# Patient Record
Sex: FEMALE | Race: BLACK OR AFRICAN AMERICAN | URBAN - METROPOLITAN AREA
[De-identification: names, ages, dates, MRNs, and addresses within clinical notes are randomized per-mention and may not be internally consistent; named-entity substitution may affect disease eponyms.]

---

## 2019-10-01 LAB — HBA1C MFR BLD HPLC: 9.8 %

## 2019-11-04 ENCOUNTER — OFFICE VISIT (OUTPATIENT)
Dept: ENDOCRINOLOGY | Age: 34
End: 2019-11-04

## 2019-11-04 VITALS
TEMPERATURE: 99.4 F | HEIGHT: 65 IN | OXYGEN SATURATION: 97 % | SYSTOLIC BLOOD PRESSURE: 137 MMHG | WEIGHT: 237.5 LBS | DIASTOLIC BLOOD PRESSURE: 80 MMHG | BODY MASS INDEX: 39.57 KG/M2 | HEART RATE: 94 BPM | RESPIRATION RATE: 18 BRPM

## 2019-11-04 DIAGNOSIS — E78.2 MIXED HYPERLIPIDEMIA: ICD-10-CM

## 2019-11-04 DIAGNOSIS — I10 ESSENTIAL HYPERTENSION: ICD-10-CM

## 2019-11-04 DIAGNOSIS — E11.65 UNCONTROLLED TYPE 2 DIABETES MELLITUS WITH HYPERGLYCEMIA (HCC): Primary | ICD-10-CM

## 2019-11-04 RX ORDER — METFORMIN HYDROCHLORIDE 1000 MG/1
1000 TABLET ORAL 2 TIMES DAILY WITH MEALS
COMMUNITY
End: 2019-11-04 | Stop reason: SDUPTHER

## 2019-11-04 RX ORDER — ETONOGESTREL 68 MG/1
IMPLANT SUBCUTANEOUS
COMMUNITY
End: 2022-06-24 | Stop reason: ALTCHOICE

## 2019-11-04 RX ORDER — NATEGLINIDE 120 MG/1
120 TABLET ORAL
Qty: 90 TAB | Refills: 6 | Status: SHIPPED | OUTPATIENT
Start: 2019-11-04 | End: 2020-04-08 | Stop reason: SDUPTHER

## 2019-11-04 RX ORDER — PEN NEEDLE, DIABETIC 31 GX3/16"
NEEDLE, DISPOSABLE MISCELLANEOUS
Qty: 100 PEN NEEDLE | Refills: 6 | Status: SHIPPED | OUTPATIENT
Start: 2019-11-04 | End: 2021-08-13 | Stop reason: SDUPTHER

## 2019-11-04 RX ORDER — METFORMIN HYDROCHLORIDE 1000 MG/1
1000 TABLET ORAL 2 TIMES DAILY WITH MEALS
Qty: 60 TAB | Refills: 6 | Status: SHIPPED | OUTPATIENT
Start: 2019-11-04 | End: 2020-04-08 | Stop reason: SDUPTHER

## 2019-11-04 RX ORDER — INSULIN LISPRO 100 [IU]/ML
2 INJECTION, SOLUTION INTRAVENOUS; SUBCUTANEOUS
COMMUNITY
End: 2019-11-04 | Stop reason: ALTCHOICE

## 2019-11-04 NOTE — PROGRESS NOTES
Room 3    Identified pt with two pt identifiers(name and ). Reviewed record in preparation for visit and have obtained necessary documentation. All patient medications has been reviewed. Chief Complaint   Patient presents with   [address] [place] [address] Diabetes       Health Maintenance Due   Topic    DTaP/Tdap/Td series (1 - Tdap)    PAP AKA CERVICAL CYTOLOGY     Influenza Age 5 to Adult        Vitals:    19 1320   BP: 137/80   Pulse: 94   Resp: 18   Temp: 99.4 °F (37.4 °C)   TempSrc: Oral   SpO2: 97%   Weight: 237 lb 8 oz (107.7 kg)   Height: 5' 5.43\" (1.662 m)   PainSc:   0 - No pain   LMP: 2019       Wt Readings from Last 3 Encounters:   19 237 lb 8 oz (107.7 kg)     Temp Readings from Last 3 Encounters:   19 99.4 °F (37.4 °C) (Oral)     BP Readings from Last 3 Encounters:   19 137/80     Pulse Readings from Last 3 Encounters:   19 94       No results found for: HBA1C, HGBE8, NMP6LKDU, PSS4DNCE, QGO3FYYR    Coordination of Care Questionnaire:   1) Have you been to an emergency room, urgent care, or hospitalized since your last visit?   no       2. Have seen or consulted any other health care provider since your last visit? NO    3) Do you have an Advanced Directive/ Living Will in place? NO  If yes, do we have a copy on file NO  If no, would you like information NO    Patient is accompanied by self I have received verbal consent from Qi Cadet to discuss any/all medical information while they are present in the room.

## 2019-11-04 NOTE — PATIENT INSTRUCTIONS
Do not skip meals Do not eat in between meals Reduce carbs- pasta, rice, potatoes, bread Do not drink juices or sodas Donot eat peanut butter Do not eat sugar free cookies and cakes Do not eat peaches, grapes, oranges, and pineapples,  And fruit medleys  
 
------------------------------------------------------------------------------------------------------------------------ Stay on toujeo at 60 units a day Stop humalog Start on starlix  120 mg  3 times a day right before meals Stay  On  jardiance  And  Metformin Start on victoza at 0.6 mg a day and after a week , increase to 1.2 mg a day

## 2019-11-04 NOTE — PROGRESS NOTES
HISTORY OF PRESENT ILLNESS  Reema Irene is a 29 y.o. female. HPI  Patient here for initial visit of Type 2 diabetes mellitus . Referred : by self/pcp    H/o diabetes for 15  years     Current A1C is 9.8 %  From oct 2019  and symptoms/problems include polyuria and polydipsia     Current diabetic medications include jardiance , lantus and humalog  And  Metformin 1 gm bid twice a day . Current monitoring regimen: home blood tests - weekly  Home blood sugar records: trend: fluctuating a lot  Any episodes of hypoglycemia? no    Weight trend: fluctuating a lot  Prior visit with dietician: no  Current diet: \"unhealthy\" diet in general  Current exercise: no regular exercise    Known diabetic complications: none  Cardiovascular risk factors: family history, diabetes mellitus    Eye exam current (within one year): no  FRANCISCO: no     Past Medical History:   Diagnosis Date    [facility]     Proliferative diabetic retinopathy [facility] [facility]     Retinal detachment 2017    left eye     Past Surgical History:   Procedure Laterality Date    HX ANKLE FRACTURE TX      HX  SECTION      HX RETINAL DETACHMENT REPAIR  2017    HX TONSILLECTOMY       Current Outpatient Medications   Medication Sig    metFORMIN (GLUCOPHAGE) 1,000 mg tablet Take 1,000 mg by mouth two (2) times daily (with meals).  insulin lispro (HUMALOG U-100 INSULIN) 100 unit/mL injection 2 Units by SubCUTAneous route daily (with dinner).  insulin glargine,hum.rec.anlog (TOUJEO SOLOSTAR U-300 INSULIN SC) 58 Units by SubCUTAneous route daily.  GLUCAGON IJ by Injection route.  etonogestrel (NEXPLANON) 68 mg impl by SubDERmal route.  empagliflozin (JARDIANCE) 25 mg tablet Take  by mouth daily.  pen needle, diabetic (BD ULTRA-FINE KELLY PEN NEEDLE) by Does Not Apply route. No current facility-administered medications for this visit. Review of Systems   Constitutional: Negative.     HENT: Negative. Eyes: Negative. Respiratory: Negative. Cardiovascular: Negative. Gastrointestinal: Negative. Genitourinary: Negative. Musculoskeletal: Negative. Skin: Negative. Neurological: Negative. Endo/Heme/Allergies: Negative. Psychiatric/Behavioral: Negative. Physical Exam   Constitutional: She is oriented to person, place, and time. She appears well-developed and well-nourished. HENT:   Head: Normocephalic. Eyes: Pupils are equal, round, and reactive to light. Conjunctivae and EOM are normal.   Neck: Normal range of motion. Neck supple. Cardiovascular: Normal rate and regular rhythm. Pulmonary/Chest: Effort normal and breath sounds normal.   Abdominal: Soft. Bowel sounds are normal.   Musculoskeletal: Normal range of motion. Neurological: She is alert and oriented to person, place, and time. She has normal reflexes. Skin: Skin is warm and dry. Psychiatric: She has a normal mood and affect. Diabetic foot exam:     Left Foot:   Visual Exam: normal    Pulse DP: 2+ (normal)   Filament test: normal sensation    Vibratory sensation: normal      Right Foot:   Visual Exam: normal    Pulse DP: 2+ (normal)   Filament test: normal sensation    Vibratory sensation: normal      ASSESSMENT and PLAN    1. Type 2 DM, poorly controlled : a1c is 9.8 %   From  Oct 2019     Discussed DM 2 patho-physiology extensively     Stay on toujeo at 60 units a day   Stop humalog   Start on starlix  120 mg  3 times a day right before meals    Stay  On  jardiance  And  Metformin   Start on victoza at 0.6 mg a day and after a week , increase to 1.2 mg a day     Patient is advised about checking blood sugars 4 times a day and maintaining log book. The danger of having low blood sugars has been explained with inappropriate use of insulin  Patient voiced understanding and using the printed instructions at home. Hypoglycemia management has been explained to the patient.      Carbohydrate counting discussed with the patient    lab results and schedule of future lab studies reviewed with patient  specific diabetic recommendations: diabetic diet discussed in detail, written exchange diet given, home glucose monitoring emphasized, home glucose monitoring demonstrated and taught, glucose meter dispensed to patient, all medications, side effects and compliance discussed carefully, use and side effects of insulin is taught, foot care discussed and Podiatry visits discussed, glycohemoglobin and other lab monitoring discussed and long term diabetic complications discussed    2. Hypoglycemia :  Educated on treating the hypoglycemia. Discussed INSULIn use and prescribed    3. HTN : continue current care. Patient is educated about importance of compliance with anti-hypertensives especially ARB/ACEI    4. Dyslipidemia : continue current meds. Patient is educated about benefits and adverse effects of statins and explained how benefits outweigh risk.     5. use of aspirin to prevent MI and TIA's discussed      > 50 % of time is spent on counseling   Patient voiced understanding her plan of care

## 2019-11-04 NOTE — LETTER
11/10/19 Patient: Esther Taveras YOB: 1985 Date of Visit: 11/4/2019 Nia Miller NP 
[address] [facility] [address] VIA Facsimile: [phone] Dear Nia Miller NP, Thank you for referring Ms. Chente Boothe to [address] [place] [address] for evaluation. My notes for this consultation are attached. If you have questions, please do not hesitate to call me. I look forward to following your patient along with you. Sincerely, Elfego Hoffman MD

## 2020-01-30 ENCOUNTER — OFFICE VISIT (OUTPATIENT)
Dept: ENDOCRINOLOGY | Age: 35
End: 2020-01-30

## 2020-01-30 VITALS
TEMPERATURE: 98.1 F | HEART RATE: 60 BPM | HEIGHT: 66 IN | DIASTOLIC BLOOD PRESSURE: 87 MMHG | BODY MASS INDEX: 36.79 KG/M2 | RESPIRATION RATE: 18 BRPM | OXYGEN SATURATION: 95 % | WEIGHT: 228.9 LBS | SYSTOLIC BLOOD PRESSURE: 145 MMHG

## 2020-01-30 DIAGNOSIS — E11.65 UNCONTROLLED TYPE 2 DIABETES MELLITUS WITH HYPERGLYCEMIA (HCC): Primary | ICD-10-CM

## 2020-01-30 DIAGNOSIS — I10 ESSENTIAL HYPERTENSION: ICD-10-CM

## 2020-01-30 DIAGNOSIS — B35.3 TINEA PEDIS, UNSPECIFIED LATERALITY: ICD-10-CM

## 2020-01-30 DIAGNOSIS — E78.2 MIXED HYPERLIPIDEMIA: ICD-10-CM

## 2020-01-30 NOTE — LETTER
2/2/20 Patient: Bridgett Peraza YOB: 1985 Date of Visit: 1/30/2020 Frankey Console, NP 
[address] [facility] [address] VIA Facsimile: [phone] Dear Frankey Console, NP, Thank you for referring Ms. Madhu Batres to [address] [place] [address] for evaluation. My notes for this consultation are attached. If you have questions, please do not hesitate to call me. I look forward to following your patient along with you. Sincerely, Ailin Ace MD

## 2020-01-30 NOTE — PATIENT INSTRUCTIONS
Do not skip meals  Do not eat in between meals    Reduce carbs- pasta, rice, potatoes, bread   Do not drink juices or sodas  Donot eat peanut butter     Do not eat sugar free cookies and cakes   Do not eat peaches, grapes, oranges, and pineapples,  And fruit medleys     ------------------------------------------------------------------------------------------------------------------------    Stay on toujeo at 60 units a day        starlix  120 mg  3 times a day right before meals      Stay  On  jardiance  And  Metformin     Stay on  victoza at 1.2 mg a day

## 2020-01-30 NOTE — PROGRESS NOTES
1. Have you been to the ER, urgent care clinic since your last visit? No  Hospitalized since your last visit? No    2. Have you seen or consulted any other health care providers outside of the [address] [place] [address] since your last visit? Include any pap smears or colon screening. No    Wt Readings from Last 3 Encounters:   01/30/20 228 lb 14.4 oz (103.8 kg)   11/04/19 237 lb 8 oz (107.7 kg)     Temp Readings from Last 3 Encounters:   01/30/20 98.1 °F (36.7 °C) (Oral)   11/04/19 99.4 °F (37.4 °C) (Oral)     BP Readings from Last 3 Encounters:   01/30/20 145/87   11/04/19 137/80     Pulse Readings from Last 3 Encounters:   01/30/20 60   11/04/19 94     Lab Results   Component Value Date/Time    Hemoglobin A1c, External 9.8 10/01/2019     Patient does not have meter today.

## 2020-01-30 NOTE — PROGRESS NOTES
HISTORY OF PRESENT ILLNESS  Dewayne Duke is a 28 y.o. female. HPI  Patient here for first follow up after  initial visit of Type 2 diabetes mellitus   From 2019     Lost 9 lbs   No meter   Labs are not available           Old history      Referred : by self/pcp    H/o diabetes for 15  years     Current A1C is 9.8 %  From oct 2019  and symptoms/problems include polyuria and polydipsia     Current diabetic medications include jardiance , lantus and humalog  And  Metformin 1 gm bid twice a day . Current monitoring regimen: home blood tests - weekly  Home blood sugar records: trend: fluctuating a lot  Any episodes of hypoglycemia? no    Weight trend: fluctuating a lot  Prior visit with dietician: no  Current diet: \"unhealthy\" diet in general  Current exercise: no regular exercise    Known diabetic complications: none  Cardiovascular risk factors: family history, diabetes mellitus    Eye exam current (within one year): no  FRANCISCO: no     Past Medical History:   Diagnosis Date    DM (diabetes mellitus) ([address]     Proliferative [facility] retin[facility] ([facility] [address]     Retinal detachment 2017    left eye     Past Surgical History:   Procedure Laterality Date    HX ANKLE FRACTURE TX      HX  SECTION      HX RETINAL DETACHMENT REPAIR  2017    HX TONSILLECTOMY       Current Outpatient Medications   Medication Sig    GLUCAGON IJ by Injection route.  etonogestrel (NEXPLANON) 68 mg impl by SubDERmal route.  pen needle, diabetic (BD ULTRA-FINE KELLY PEN NEEDLE) by Does Not Apply route.  empagliflozin (JARDIANCE) 25 mg tablet Take 1 Tab by mouth daily.  metFORMIN (GLUCOPHAGE) 1,000 mg tablet Take 1 Tab by mouth two (2) times daily (with meals).  insulin glargine U-300 conc (TOUJEO SOLOSTAR U-300 INSULIN) 300 unit/mL (1.5 mL) inpn pen Inject 60 units at bedtime.  nateglinide (STARLIX) 120 mg tablet Take 1 Tab by mouth Before breakfast, lunch, and dinner.  Stop Humalog    liraglutide (VICTOZA) 0.6 mg/0.1 mL (18 mg/3 mL) pnij Inject 0.6mg daily for one week, then inject 1.2mg daily thereafter.  Insulin Needles, Disposable, (KELLY PEN NEEDLE) 32 gauge x 5/32\" ndle Use to inject insulin once daily. Dx. Code E11.65    insulin glargine,hum.rec.anlog (TOUJEO SOLOSTAR U-300 INSULIN SC) 58 Units by SubCUTAneous route daily. No current facility-administered medications for this visit. Review of Systems   Constitutional: Negative. HENT: Negative. Eyes: Negative. Respiratory: Negative. Cardiovascular: Negative. Gastrointestinal: Negative. Genitourinary: Negative. Musculoskeletal: Negative. Skin: Negative. Neurological: Negative. Endo/Heme/Allergies: Negative. Psychiatric/Behavioral: Negative. Physical Exam   Constitutional: She is oriented to person, place, and time. She appears well-developed and well-nourished. HENT:   Head: Normocephalic. Eyes: Pupils are equal, round, and reactive to light. Conjunctivae and EOM are normal.   Neck: Normal range of motion. Neck supple. Cardiovascular: Normal rate and regular rhythm. Pulmonary/Chest: Effort normal and breath sounds normal.   Abdominal: Soft. Bowel sounds are normal.   Musculoskeletal: Normal range of motion. Neurological: She is alert and oriented to person, place, and time. She has normal reflexes. Skin: Skin is warm and dry. Psychiatric: She has a normal mood and affect. Diabetic foot exam:     Left Foot:   Visual Exam: normal    Pulse DP: 2+ (normal)   Filament test: normal sensation    Vibratory sensation: normal      Right Foot:   Visual Exam: normal    Pulse DP: 2+ (normal)   Filament test: normal sensation    Vibratory sensation: normal      ASSESSMENT and PLAN    1.   Type 2 DM, poorly controlled : a1c is   ???   Compared to    9.8 %   From  Oct 2019     Unable to comment on progress yet , but she lost weight   No log   No labs , ordered today     Stay on toujeo at 60 units a day   Stop humalog   Stay on  starlix  120 mg  3 times a day right before meals    Stay  On  jardiance  And  Metformin   Stay  on victoza at 1.2 mg a day     Patient is advised about checking blood sugars 4 times a day and maintaining log book. The danger of having low blood sugars has been explained with inappropriate use of insulin  Patient voiced understanding and using the printed instructions at home. Hypoglycemia management has been explained to the patient. Carbohydrate counting discussed with the patient  lab results and schedule of future lab studies reviewed with patient    2. Hypoglycemia :  Educated on treating the hypoglycemia. Discussed INSULIn use and prescribed    3. HTN : uncontrolled,  not on meds . Patient is educated about importance of compliance with anti-hypertensives especially ARB/ACEI    4. Dyslipidemia : not on meds. Patient is educated about benefits and adverse effects of statins and explained how benefits outweigh risk.     5. use of aspirin to prevent MI and TIA's discussed    Tinea pedis -  Referred her to podiatrist       > 50 % of time is spent on counseling   Patient voiced understanding her plan of care

## 2020-02-03 LAB
ALBUMIN SERPL-MCNC: 4.1 G/DL (ref 3.8–4.8)
ALBUMIN/CREAT UR: 207 MG/G CREAT (ref 0–29)
ALBUMIN/GLOB SERPL: 1.3 {RATIO} (ref 1.2–2.2)
ALP SERPL-CCNC: 94 IU/L (ref 39–117)
ALT SERPL-CCNC: 15 IU/L (ref 0–32)
AST SERPL-CCNC: 9 IU/L (ref 0–40)
BILIRUB SERPL-MCNC: 0.6 MG/DL (ref 0–1.2)
BUN SERPL-MCNC: 11 MG/DL (ref 6–20)
BUN/CREAT SERPL: 18 (ref 9–23)
CALCIUM SERPL-MCNC: 9.2 MG/DL (ref 8.7–10.2)
CHLORIDE SERPL-SCNC: 101 MMOL/L (ref 96–106)
CHOLEST SERPL-MCNC: 173 MG/DL (ref 100–199)
CO2 SERPL-SCNC: 18 MMOL/L (ref 20–29)
CREAT SERPL-MCNC: 0.61 MG/DL (ref 0.57–1)
CREAT UR-MCNC: 57.7 MG/DL
EST. AVERAGE GLUCOSE BLD GHB EST-MCNC: 255 MG/DL
GLOBULIN SER CALC-MCNC: 3.1 G/DL (ref 1.5–4.5)
GLUCOSE SERPL-MCNC: 146 MG/DL (ref 65–99)
HBA1C MFR BLD: 10.5 % (ref 4.8–5.6)
HDLC SERPL-MCNC: 44 MG/DL
INTERPRETATION, 910389: NORMAL
LDLC SERPL CALC-MCNC: 88 MG/DL (ref 0–99)
Lab: NORMAL
MICROALBUMIN UR-MCNC: 119.6 UG/ML
POTASSIUM SERPL-SCNC: 4.3 MMOL/L (ref 3.5–5.2)
PROT SERPL-MCNC: 7.2 G/DL (ref 6–8.5)
SODIUM SERPL-SCNC: 137 MMOL/L (ref 134–144)
SPECIMEN STATUS REPORT, ROLRST: NORMAL
TRIGL SERPL-MCNC: 204 MG/DL (ref 0–149)
VLDLC SERPL CALC-MCNC: 41 MG/DL (ref 5–40)

## 2020-02-04 NOTE — PROGRESS NOTES
Looks like diabetes is worser than prior visits,   Kidneys are being hurt from diabetes     Take care of diabetes more, check more,  eat right etc.,

## 2020-02-05 ENCOUNTER — TELEPHONE (OUTPATIENT)
Dept: ENDOCRINOLOGY | Age: 35
End: 2020-02-05

## 2020-02-05 NOTE — TELEPHONE ENCOUNTER
Patient called to get A1c stating she does not have my chart, complained that she has called 3xs today and no call back.  States message with results can be left

## 2020-02-05 NOTE — TELEPHONE ENCOUNTER
----- Message from Gautam Leyva sent at 2/5/2020  1:51 PM EST -----  Regarding: Dr. Mynor Britt  Pt would like a call regarding her test results.  Contact is [phone] would like call back after 4:15pm

## 2020-02-27 DIAGNOSIS — E11.65 UNCONTROLLED TYPE 2 DIABETES MELLITUS WITH HYPERGLYCEMIA (HCC): Primary | ICD-10-CM

## 2020-02-27 NOTE — TELEPHONE ENCOUNTER
Patient is requesting the insulin for Hodgkins Frias, and Victoza rx to be sent to pcp office they have a pharmacy that she can use that is much cheaper. [facility].  She does not know the fax

## 2020-02-27 NOTE — TELEPHONE ENCOUNTER
PCP: Rodolfo Rice NP    Last appt: 1/30/2020  Future Appointments   Date Time Provider Agustina Suarez   4/8/2020  3:45 PM Soraya Bobo MD CDE [facility] SCHED       Requested Prescriptions     Pending Prescriptions Disp Refills    liraglutide (VICTOZA) 0.6 mg/0.1 mL (18 mg/3 mL) pnij 6 mL 6     Sig: Inject 0.6mg daily for one week, then inject 1.2mg daily thereafter.  insulin glargine U-300 conc (TOUJEO SOLOSTAR U-300 INSULIN) 300 unit/mL (1.5 mL) inpn pen 30 mL 6     Sig: Inject 60 units at bedtime.  empagliflozin (JARDIANCE) 25 mg tablet 30 Tab 6     Sig: Take 1 Tab by mouth daily.

## 2020-02-27 NOTE — TELEPHONE ENCOUNTER
Two pt identifiers confirmed. Received [facility] contact number to call to receive pharmacy's fax number. pt verbalized understanding of information discussed w/ no further questions at this time.

## 2020-02-27 NOTE — TELEPHONE ENCOUNTER
Two pt identifiers confirmed. Received fax number [phone] to [facility]. Pharmacy verbalized understanding of information discussed w/ no further questions at this time.

## 2020-04-08 ENCOUNTER — VIRTUAL VISIT (OUTPATIENT)
Dept: ENDOCRINOLOGY | Age: 35
End: 2020-04-08

## 2020-04-08 DIAGNOSIS — I10 ESSENTIAL HYPERTENSION: ICD-10-CM

## 2020-04-08 DIAGNOSIS — E11.65 UNCONTROLLED TYPE 2 DIABETES MELLITUS WITH HYPERGLYCEMIA (HCC): Primary | ICD-10-CM

## 2020-04-08 DIAGNOSIS — E78.2 MIXED HYPERLIPIDEMIA: ICD-10-CM

## 2020-04-08 DIAGNOSIS — R80.1 PERSISTENT PROTEINURIA: ICD-10-CM

## 2020-04-08 RX ORDER — IRBESARTAN 150 MG/1
150 TABLET ORAL DAILY
Qty: 90 TAB | Refills: 2 | Status: SHIPPED | OUTPATIENT
Start: 2020-04-08 | End: 2020-08-18 | Stop reason: SDUPTHER

## 2020-04-08 RX ORDER — NATEGLINIDE 120 MG/1
120 TABLET ORAL
Qty: 270 TAB | Refills: 2 | Status: SHIPPED | OUTPATIENT
Start: 2020-04-08 | End: 2020-08-18 | Stop reason: SDUPTHER

## 2020-04-08 RX ORDER — METFORMIN HYDROCHLORIDE 1000 MG/1
1000 TABLET ORAL 2 TIMES DAILY WITH MEALS
Qty: 180 TAB | Refills: 2 | Status: SHIPPED | OUTPATIENT
Start: 2020-04-08 | End: 2020-08-18 | Stop reason: SDUPTHER

## 2020-04-08 NOTE — PROGRESS NOTES
1. Have you been to the ER, urgent care clinic since your last visit? [facility]/March 2020/Fungal Infection On Finger  Hospitalized since your last visit? No    2. Have you seen or consulted any other health care providers outside of the [address] [place] [address] since your last visit? Include any pap smears or colon screening.  No

## 2020-04-08 NOTE — PROGRESS NOTES
HISTORY OF PRESENT ILLNESS  Rachael Dodson is a 28 y.o. female. HPI  Patient here for first follow up after  initial visit of Type 2 diabetes mellitus   From 2020  Lost 9 lbs   No meter   Labs are not available           Old history      Referred : by self/pcp    H/o diabetes for 15  years     Current A1C is 9.8 %  From oct 2019  and symptoms/problems include polyuria and polydipsia     Current diabetic medications include jardiance , lantus and humalog  And  Metformin 1 gm bid twice a day . Current monitoring regimen: home blood tests - weekly  Home blood sugar records: trend: fluctuating a lot  Any episodes of hypoglycemia? no    Weight trend: fluctuating a lot  Prior visit with dietician: no  Current diet: \"unhealthy\" diet in general  Current exercise: no regular exercise    Known diabetic complications: none  Cardiovascular risk factors: family history, diabetes mellitus    Eye exam current (within one year): no  FRANCISCO: no     Past Medical History:   Diagnosis Date    [facility] [address] [facility]     Proliferative diabetic retinopathy ([facility]     Retinal detachment 2017    left eye     Past Surgical History:   Procedure Laterality Date    HX ANKLE FRACTURE TX      HX  SECTION      HX RETINAL DETACHMENT REPAIR  2017    HX TONSILLECTOMY       Current Outpatient Medications   Medication Sig    liraglutide (VICTOZA) 0.6 mg/0.1 mL (18 mg/3 mL) pnij Inject 0.6mg daily for one week, then inject 1.2mg daily thereafter.  insulin glargine U-300 conc (TOUJEO SOLOSTAR U-300 INSULIN) 300 unit/mL (1.5 mL) inpn pen Inject 60 units at bedtime.  empagliflozin (JARDIANCE) 25 mg tablet Take 1 Tab by mouth daily.  GLUCAGON IJ by Injection route.  etonogestrel (NEXPLANON) 68 mg impl by SubDERmal route.  pen needle, diabetic (BD ULTRA-FINE KELLY PEN NEEDLE) by Does Not Apply route.     metFORMIN (GLUCOPHAGE) 1,000 mg tablet Take 1 Tab by mouth two (2) times daily (with meals).  nateglinide (STARLIX) 120 mg tablet Take 1 Tab by mouth Before breakfast, lunch, and dinner. Stop Humalog    Insulin Needles, Disposable, (KELLY PEN NEEDLE) 32 gauge x 5/32\" ndle Use to inject insulin once daily. Dx. Code E11.65     No current facility-administered medications for this visit. Review of Systems   Constitutional: Negative. HENT: Negative. Eyes: Negative. Respiratory: Negative. Cardiovascular: Negative. Gastrointestinal: Negative. Genitourinary: Negative. Musculoskeletal: Negative. Skin: Negative. Neurological: Negative. Endo/Heme/Allergies: Negative. Psychiatric/Behavioral: Negative. Physical Exam   Constitutional: She is oriented to person, place, and time. She appears well-developed and well-nourished. HENT:   Head: Normocephalic. Eyes: Pupils are equal, round, and reactive to light. Conjunctivae and EOM are normal.   Neck: Normal range of motion. Neck supple. Cardiovascular: Normal rate and regular rhythm. Pulmonary/Chest: Effort normal and breath sounds normal.   Abdominal: Soft. Bowel sounds are normal.   Musculoskeletal: Normal range of motion. Neurological: She is alert and oriented to person, place, and time. She has normal reflexes. Skin: Skin is warm and dry. Psychiatric: She has a normal mood and affect.      Diabetic foot exam:     Left Foot:   Visual Exam: normal    Pulse DP: 2+ (normal)   Filament test: normal sensation    Vibratory sensation: normal      Right Foot:   Visual Exam: normal    Pulse DP: 2+ (normal)   Filament test: normal sensation    Vibratory sensation: normal        Lab Results   Component Value Date/Time    Hemoglobin A1c 10.5 (H) 02/01/2020 07:42 AM    Hemoglobin A1c, External 9.8 10/01/2019    Glucose 146 (H) 02/01/2020 07:42 AM    Microalb/Creat ratio (ug/mg creat.) 207 (H) 02/01/2020 07:42 AM    LDL, calculated 88 02/01/2020 07:42 AM    Creatinine 0.61 02/01/2020 07:42 AM      Lab Results Component Value Date/Time    Cholesterol, total 173 02/01/2020 07:42 AM    HDL Cholesterol 44 02/01/2020 07:42 AM    LDL, calculated 88 02/01/2020 07:42 AM    Triglyceride 204 (H) 02/01/2020 07:42 AM     Lab Results   Component Value Date/Time    ALT (SGPT) 15 02/01/2020 07:42 AM    AST (SGOT) 9 02/01/2020 07:42 AM    Alk. phosphatase 94 02/01/2020 07:42 AM    Bilirubin, total 0.6 02/01/2020 07:42 AM    Albumin 4.1 02/01/2020 07:42 AM    Protein, total 7.2 02/01/2020 07:42 AM     Lab Results   Component Value Date/Time    GFR est non- 02/01/2020 07:42 AM    GFR est  02/01/2020 07:42 AM    Creatinine 0.61 02/01/2020 07:42 AM    BUN 11 02/01/2020 07:42 AM    Sodium 137 02/01/2020 07:42 AM    Potassium 4.3 02/01/2020 07:42 AM    Chloride 101 02/01/2020 07:42 AM    CO2 18 (L) 02/01/2020 07:42 AM         ASSESSMENT and PLAN    1. Type 2 DM, poorly controlled : a1c is  Due  Now , not done    10 %      From feb 2020    Compared  To     9.8 %   From  Oct 2019     Unable to comment on progress yet , but she lost weight   Verbal log : 187, 201 , 164 , 138 , 161       Stay on toujeo at 60 units a day   Stop humalog   Stay on  starlix  120 mg  3 times a day right before meals    Stay  On  jardiance  And  Metformin   Stay  on victoza at 1.2 mg a day     Patient is advised about checking blood sugars 4 times a day and maintaining log book. The danger of having low blood sugars has been explained with inappropriate use of insulin  Patient voiced understanding and using the printed instructions at home. Hypoglycemia management has been explained to the patient. Carbohydrate counting discussed with the patient  lab results and schedule of future lab studies reviewed with patient    2. Hypoglycemia :  Educated on treating the hypoglycemia. Discussed INSULIn use and prescribed    3. HTN : uncontrolled,  Starting on irbesartan as proteinuria is noticeable    .  Patient is educated about importance of compliance with anti-hypertensives especially ARB/ACEI    4. Dyslipidemia : LDL is 88, not on meds. Patient is educated about benefits and adverse effects of statins and explained how benefits outweigh risk. 5. use of aspirin to prevent MI and TIA's discussed      Pursuant  To the Emergency declaration under the [employer] and the [employer], [address] [place] [address] and the [facility], to reduce the patient's risk of exposure to  COVID-19 and provide continuity of care for an established patient.           > 50 % of time is spent on counseling   Patient voiced understanding her plan of care

## 2020-04-08 NOTE — PATIENT INSTRUCTIONS
Do not skip meals Do not eat in between meals Reduce carbs- pasta, rice, potatoes, bread Do not drink juices or sodas Donot eat peanut butter Do not eat sugar free cookies and cakes Do not eat peaches, grapes, oranges, and pineapples,  And fruit medleys  
 
------------------------------------------------------------------------------------------------------------------------ Start irbesartan 150  Mg a day Stay on toujeo at 60 units a day  
 
 
 starlix  120 mg  3 times a day right before meals Stay  On  jardiance  And  Metformin Stay on  victoza at 1.2 mg a day

## 2020-06-03 ENCOUNTER — TELEPHONE (OUTPATIENT)
Dept: ENDOCRINOLOGY | Age: 35
End: 2020-06-03

## 2020-06-03 NOTE — TELEPHONE ENCOUNTER
She works in a medical office, she doesn't wish to return to work at this time, due to corona virus  Please print letter

## 2020-06-03 NOTE — LETTER
6/3/2020 3:43 PM 
 
Ms. Méndez [address] [facility] [address] This is to notify that this patient is at high risk for developing complications if he/she gets affected by SARS COVID -2 virus and must be provided with an environment where he /she will have least human contact to avoid exposure to the virus. Sincerely, Shanell Fonseca MD

## 2020-07-10 DIAGNOSIS — E11.65 UNCONTROLLED TYPE 2 DIABETES MELLITUS WITH HYPERGLYCEMIA (HCC): Primary | ICD-10-CM

## 2020-07-10 DIAGNOSIS — I10 ESSENTIAL HYPERTENSION: ICD-10-CM

## 2020-07-10 DIAGNOSIS — E78.2 MIXED HYPERLIPIDEMIA: ICD-10-CM

## 2020-08-17 LAB
ALBUMIN SERPL-MCNC: 4.4 G/DL (ref 3.8–4.8)
ALBUMIN/CREAT UR: 134 MG/G CREAT (ref 0–29)
ALBUMIN/GLOB SERPL: 1.3 {RATIO} (ref 1.2–2.2)
ALP SERPL-CCNC: 100 IU/L (ref 39–117)
ALT SERPL-CCNC: 19 IU/L (ref 0–32)
AST SERPL-CCNC: 15 IU/L (ref 0–40)
BILIRUB SERPL-MCNC: 0.5 MG/DL (ref 0–1.2)
BUN SERPL-MCNC: 14 MG/DL (ref 6–20)
BUN/CREAT SERPL: 21 (ref 9–23)
CALCIUM SERPL-MCNC: 10.1 MG/DL (ref 8.7–10.2)
CHLORIDE SERPL-SCNC: 98 MMOL/L (ref 96–106)
CHOLEST SERPL-MCNC: 181 MG/DL (ref 100–199)
CO2 SERPL-SCNC: 22 MMOL/L (ref 20–29)
CREAT SERPL-MCNC: 0.68 MG/DL (ref 0.57–1)
CREAT UR-MCNC: 47.9 MG/DL
EST. AVERAGE GLUCOSE BLD GHB EST-MCNC: 200 MG/DL
GLOBULIN SER CALC-MCNC: 3.3 G/DL (ref 1.5–4.5)
GLUCOSE SERPL-MCNC: 249 MG/DL (ref 65–99)
HBA1C MFR BLD: 8.6 % (ref 4.8–5.6)
HDLC SERPL-MCNC: 44 MG/DL
INTERPRETATION, 910389: NORMAL
LDLC SERPL CALC-MCNC: 111 MG/DL (ref 0–99)
Lab: NORMAL
MICROALBUMIN UR-MCNC: 64.3 UG/ML
POTASSIUM SERPL-SCNC: 4.1 MMOL/L (ref 3.5–5.2)
PROT SERPL-MCNC: 7.7 G/DL (ref 6–8.5)
SODIUM SERPL-SCNC: 138 MMOL/L (ref 134–144)
TRIGL SERPL-MCNC: 128 MG/DL (ref 0–149)
VLDLC SERPL CALC-MCNC: 26 MG/DL (ref 5–40)

## 2020-08-18 ENCOUNTER — OFFICE VISIT (OUTPATIENT)
Dept: ENDOCRINOLOGY | Age: 35
End: 2020-08-18
Payer: COMMERCIAL

## 2020-08-18 VITALS
RESPIRATION RATE: 18 BRPM | DIASTOLIC BLOOD PRESSURE: 70 MMHG | WEIGHT: 221.2 LBS | TEMPERATURE: 99 F | HEART RATE: 113 BPM | HEIGHT: 66 IN | SYSTOLIC BLOOD PRESSURE: 127 MMHG | OXYGEN SATURATION: 97 % | BODY MASS INDEX: 35.55 KG/M2

## 2020-08-18 DIAGNOSIS — E78.2 MIXED HYPERLIPIDEMIA: ICD-10-CM

## 2020-08-18 DIAGNOSIS — E11.65 UNCONTROLLED TYPE 2 DIABETES MELLITUS WITH HYPERGLYCEMIA (HCC): Primary | ICD-10-CM

## 2020-08-18 DIAGNOSIS — I10 ESSENTIAL HYPERTENSION: ICD-10-CM

## 2020-08-18 DIAGNOSIS — R80.1 PERSISTENT PROTEINURIA: ICD-10-CM

## 2020-08-18 DIAGNOSIS — E11.65 UNCONTROLLED TYPE 2 DIABETES MELLITUS WITH HYPERGLYCEMIA (HCC): ICD-10-CM

## 2020-08-18 PROCEDURE — 99214 OFFICE O/P EST MOD 30 MIN: CPT | Performed by: INTERNAL MEDICINE

## 2020-08-18 PROCEDURE — 3052F HG A1C>EQUAL 8.0%<EQUAL 9.0%: CPT | Performed by: INTERNAL MEDICINE

## 2020-08-18 RX ORDER — INSULIN GLARGINE 300 U/ML
INJECTION, SOLUTION SUBCUTANEOUS
Qty: 30 ML | Refills: 6 | Status: SHIPPED | OUTPATIENT
Start: 2020-08-18 | End: 2020-09-01 | Stop reason: CLARIF

## 2020-08-18 RX ORDER — NATEGLINIDE 120 MG/1
120 TABLET ORAL
Qty: 270 TAB | Refills: 2 | Status: SHIPPED | OUTPATIENT
Start: 2020-08-18 | End: 2021-04-02 | Stop reason: SDUPTHER

## 2020-08-18 RX ORDER — IRBESARTAN 150 MG/1
150 TABLET ORAL DAILY
Qty: 90 TAB | Refills: 2 | Status: SHIPPED | OUTPATIENT
Start: 2020-08-18 | End: 2021-04-02 | Stop reason: SDUPTHER

## 2020-08-18 RX ORDER — METFORMIN HYDROCHLORIDE 1000 MG/1
1000 TABLET ORAL 2 TIMES DAILY WITH MEALS
Qty: 180 TAB | Refills: 2 | Status: SHIPPED | OUTPATIENT
Start: 2020-08-18 | End: 2021-04-02 | Stop reason: SDUPTHER

## 2020-08-18 NOTE — PROGRESS NOTES
HISTORY OF PRESENT ILLNESS  Jose Bains is a 28 y.o. female. HPI  Patient here for second  follow up after  initial visit of Type 2 diabetes mellitus   From 2020    Pt feels that she has done better on sugar s  No meter though     Pt works at a medical office as , and stays pretty busy she says to check sugars         Old history  :    Lost 9 lbs   No meter   Labs are not available           Old history      Referred : by self/pcp    H/o diabetes for 15  years     Current A1C is 9.8 %  From oct 2019  and symptoms/problems include polyuria and polydipsia     Current diabetic medications include jardiance , lantus and humalog  And  Metformin 1 gm bid twice a day . Current monitoring regimen: home blood tests - weekly  Home blood sugar records: trend: fluctuating a lot  Any episodes of hypoglycemia? no    Weight trend: fluctuating a lot  Prior visit with dietician: no  Current diet: \"unhealthy\" diet in general  Current exercise: no regular exercise    Known diabetic complications: none  Cardiovascular risk factors: family history, diabetes mellitus    Eye exam current (within one year): no  FRANCISCO: no     Past Medical History:   Diagnosis Date    DM (diabetes mellitus) ([address] [address] 2005    Proliferative diabetic retinopathy ([facility] [address]     Retinal detachment 2017    left eye     Past Surgical History:   Procedure Laterality Date    HX ANKLE FRACTURE TX      HX  SECTION      HX RETINAL DETACHMENT REPAIR  2017    HX TONSILLECTOMY       Current Outpatient Medications   Medication Sig    nateglinide (STARLIX) 120 mg tablet Take 1 Tab by mouth Before breakfast, lunch, and dinner.  metFORMIN (GLUCOPHAGE) 1,000 mg tablet Take 1 Tab by mouth two (2) times daily (with meals).  irbesartan (AVAPRO) 150 mg tablet Take 1 Tab by mouth daily.  liraglutide (VICTOZA) 0.6 mg/0.1 mL (18 mg/3 mL) pnij Inject 0.6mg daily for one week, then inject 1.2mg daily thereafter.     insulin glargine U-300 conc (TOUJEO SOLOSTAR U-300 INSULIN) 300 unit/mL (1.5 mL) inpn pen Inject 60 units at bedtime.  empagliflozin (JARDIANCE) 25 mg tablet Take 1 Tab by mouth daily.  GLUCAGON IJ by Injection route.  etonogestreL (Nexplanon) 68 mg impl by SubDERmal route.  pen needle, diabetic (BD ULTRA-FINE KELLY PEN NEEDLE) by Does Not Apply route.  Insulin Needles, Disposable, (KELLY PEN NEEDLE) 32 gauge x 5/32\" ndle Use to inject insulin once daily. Dx. Code E11.65     No current facility-administered medications for this visit. Review of Systems   Constitutional: Negative. HENT: Negative. Eyes: Negative. Respiratory: Negative. Cardiovascular: Negative. Gastrointestinal: Negative. Genitourinary: Negative. Musculoskeletal: Negative. Skin: Negative. Neurological: Negative. Endo/Heme/Allergies: Negative. Psychiatric/Behavioral: Negative. Physical Exam   Constitutional: She is oriented to person, place, and time. She appears well-developed and well-nourished. HENT:   Head: Normocephalic. Eyes: Pupils are equal, round, and reactive to light. Conjunctivae and EOM are normal.   Neck: Normal range of motion. Neck supple. Cardiovascular: Normal rate and regular rhythm. Pulmonary/Chest: Effort normal and breath sounds normal.   Abdominal: Soft. Bowel sounds are normal.   Musculoskeletal: Normal range of motion. Neurological: She is alert and oriented to person, place, and time. She has normal reflexes. Skin: Skin is warm and dry. Psychiatric: She has a normal mood and affect.      Diabetic foot exam:       Lab Results   Component Value Date/Time    Hemoglobin A1c 8.6 (H) 08/15/2020 07:41 AM    Hemoglobin A1c 10.5 (H) 02/01/2020 07:42 AM    Hemoglobin A1c, External 9.8 10/01/2019    Glucose 249 (H) 08/15/2020 07:41 AM    Microalb/Creat ratio (ug/mg creat.) 134 (H) 08/15/2020 07:41 AM    LDL, calculated 111 (H) 08/15/2020 07:41 AM    Creatinine 0.68 08/15/2020 07:41 AM      Lab Results   Component Value Date/Time    Cholesterol, total 181 08/15/2020 07:41 AM    HDL Cholesterol 44 08/15/2020 07:41 AM    LDL, calculated 111 (H) 08/15/2020 07:41 AM    Triglyceride 128 08/15/2020 07:41 AM     Lab Results   Component Value Date/Time    ALT (SGPT) 19 08/15/2020 07:41 AM    Alk. phosphatase 100 08/15/2020 07:41 AM    Bilirubin, total 0.5 08/15/2020 07:41 AM    Albumin 4.4 08/15/2020 07:41 AM    Protein, total 7.7 08/15/2020 07:41 AM     Lab Results   Component Value Date/Time    GFR est non- 08/15/2020 07:41 AM    GFR est  08/15/2020 07:41 AM    Creatinine 0.68 08/15/2020 07:41 AM    BUN 14 08/15/2020 07:41 AM    Sodium 138 08/15/2020 07:41 AM    Potassium 4.1 08/15/2020 07:41 AM    Chloride 98 08/15/2020 07:41 AM    CO2 22 08/15/2020 07:41 AM         ASSESSMENT and PLAN    1. Type 2 DM, poorly controlled : a1c is 8.6 %    From august 2020   Compared to  10. 5  %      From feb 2020    Compared  To     9.8 %   From  Oct 2019     August  2020    Stay on toujeo at 60 units a day   I stopped  humalog to make it simpler for her  And got her  on  starlix  120 mg  3 times a day right before meals    Stay  On  jardiance  And  Metformin   Stay  on victoza at 1.2 mg a day       Today , I encouraged her to use Erin Show   I brought the discussion about resuming meal time insulin to achieve a goal of 7 % a1c   Gave all the names to google up       April 2020      Unable to comment on progress yet , but she lost weight   Verbal log : 187, 201 , 164 , 138 , 161     Stay on toujeo at 60 units a day   Stop humalog   Stay on  starlix  120 mg  3 times a day right before meals    Stay  On  jardiance  And  Metformin   Stay  on victoza at 1.2 mg a day     Patient is advised about checking blood sugars 4 times a day and maintaining log book.  The danger of having low blood sugars has been explained with inappropriate use of insulin  Patient voiced understanding and using the printed instructions at home. Hypoglycemia management has been explained to the patient. Carbohydrate counting discussed with the patient  lab results and schedule of future lab studies reviewed with patient    2. Hypoglycemia :  Educated on treating the hypoglycemia. Discussed INSULIn use and prescribed    3. HTN : stay  on irbesartan as proteinuria is noticeable    . Patient is educated about importance of compliance with anti-hypertensives especially ARB/ACEI    4. Dyslipidemia : LDL is 111, not on meds. Patient is educated about benefits and adverse effects of statins and explained how benefits outweigh risk.     5. use of aspirin to prevent MI and TIA's discussed        > 50 % of time is spent on counseling   Patient voiced understanding her plan of care

## 2020-08-18 NOTE — PATIENT INSTRUCTIONS
Do not skip meals Do not eat in between meals Reduce carbs- pasta, rice, potatoes, bread Do not drink juices or sodas Donot eat peanut butter Do not eat sugar free cookies and cakes Do not eat peaches, grapes, oranges, and pineapples,  And fruit medleys  
 
------------------------------------------------------------------------------------------------------------------------ Stay on  irbesartan 150  Mg a day Stay on toujeo at 60 units a day  
 
 starlix  120 mg  3 times a day right before meals Stay  On  jardiance  And  Metformin Stay on  victoza at 1.2 mg a day  
 
 
------------------------------------------------------------------- [facility]   - google up ( max basal  - 40 units  ) 
 
 
afrezza - google up  ( instead of starlix )  - might be a smarter option   
 
 
 
humalog - new insulin  guera  ( pens )

## 2020-08-18 NOTE — LETTER
8/18/20 Patient: Marisela Talley YOB: 1985 Date of Visit: 8/18/2020 Luz Leiva NP 
[address] [place] [address] VIA Facsimile: [phone] Dear Luz Leiva NP, Thank you for referring Ms. Jazmin Martinez to [address] [place] [address] for evaluation. My notes for this consultation are attached. If you have questions, please do not hesitate to call me. I look forward to following your patient along with you. Sincerely, Tawanda Abdul MD

## 2020-08-18 NOTE — PROGRESS NOTES
1. Have you been to the ER, urgent care clinic since your last visit? No  Hospitalized since your last visit? No    2. Have you seen or consulted any other health care providers outside of the [address] [place] [address] since your last visit? Include any pap smears or colon screening. No    Wt Readings from Last 3 Encounters:   08/18/20 221 lb 3.2 oz (100.3 kg)   01/30/20 228 lb 14.4 oz (103.8 kg)   11/04/19 237 lb 8 oz (107.7 kg)     Temp Readings from Last 3 Encounters:   08/18/20 99 °F (37.2 °C) (Oral)   01/30/20 98.1 °F (36.7 °C) (Oral)   11/04/19 99.4 °F (37.4 °C) (Oral)     BP Readings from Last 3 Encounters:   08/18/20 127/70   01/30/20 145/87   11/04/19 137/80     Pulse Readings from Last 3 Encounters:   08/18/20 (!) 113   01/30/20 60   11/04/19 94     Lab Results   Component Value Date/Time    Hemoglobin A1c 8.6 (H) 08/15/2020 07:41 AM    Hemoglobin A1c, External 9.8 10/01/2019     Patient does not have meter today.

## 2020-09-01 ENCOUNTER — DOCUMENTATION ONLY (OUTPATIENT)
Dept: ENDOCRINOLOGY | Age: 35
End: 2020-09-01

## 2020-09-01 NOTE — PROGRESS NOTES
Kezia Ruggiero ,     Call pharmacy and ask for basal insulins which can go thru with no prior Silvester Cabot, MD

## 2020-09-02 NOTE — PROGRESS NOTES
You can change to TRESIBA unit-100 pens at  60 units at night  and send it to pharmacy ( she needs a  on tresiba savings card)     Message to pharmacy- help patient  use the savings card online

## 2020-09-10 RX ORDER — INSULIN DEGLUDEC INJECTION 100 U/ML
60 INJECTION, SOLUTION SUBCUTANEOUS
Qty: 30 ML | Refills: 6 | Status: SHIPPED | OUTPATIENT
Start: 2020-09-10 | End: 2020-10-28 | Stop reason: SDUPTHER

## 2020-10-29 RX ORDER — INSULIN DEGLUDEC INJECTION 100 U/ML
60 INJECTION, SOLUTION SUBCUTANEOUS
Qty: 30 ML | Refills: 6 | Status: SHIPPED | OUTPATIENT
Start: 2020-10-29 | End: 2021-04-02 | Stop reason: SDUPTHER

## 2021-04-02 DIAGNOSIS — E11.65 UNCONTROLLED TYPE 2 DIABETES MELLITUS WITH HYPERGLYCEMIA (HCC): ICD-10-CM

## 2021-04-02 RX ORDER — NATEGLINIDE 120 MG/1
120 TABLET ORAL
Qty: 90 TAB | Refills: 2 | Status: SHIPPED | OUTPATIENT
Start: 2021-04-02 | End: 2021-08-13 | Stop reason: SDUPTHER

## 2021-04-02 RX ORDER — IRBESARTAN 150 MG/1
150 TABLET ORAL DAILY
Qty: 30 TAB | Refills: 2 | Status: SHIPPED | OUTPATIENT
Start: 2021-04-02 | End: 2021-08-13 | Stop reason: SDUPTHER

## 2021-04-02 RX ORDER — METFORMIN HYDROCHLORIDE 1000 MG/1
1000 TABLET ORAL 2 TIMES DAILY WITH MEALS
Qty: 60 TAB | Refills: 2 | Status: SHIPPED | OUTPATIENT
Start: 2021-04-02 | End: 2021-08-13 | Stop reason: SDUPTHER

## 2021-04-02 RX ORDER — INSULIN DEGLUDEC INJECTION 100 U/ML
60 INJECTION, SOLUTION SUBCUTANEOUS
Qty: 30 ML | Refills: 2 | Status: SHIPPED | OUTPATIENT
Start: 2021-04-02 | End: 2021-05-25 | Stop reason: ALTCHOICE

## 2021-04-02 NOTE — TELEPHONE ENCOUNTER
You can print on these situations and fax them as I could not get to them and being Friday evening -   I reduced refills to only 3

## 2021-05-13 ENCOUNTER — DOCUMENTATION ONLY (OUTPATIENT)
Dept: ENDOCRINOLOGY | Age: 36
End: 2021-05-13

## 2021-05-13 NOTE — PROGRESS NOTES
José Miguel Funk (Lopez: Emelia Kwong)  Rolo Venango FlexTouch (insulin degludec injection) 100 Units/mL solution     Form  [employer] Electronic Prior Authorization Form (2017 NCPDP)    Wait for Determination  Please wait for [employer] 2017 NCPDP to return a determination.

## 2021-05-25 ENCOUNTER — OFFICE VISIT (OUTPATIENT)
Dept: ENDOCRINOLOGY | Age: 36
End: 2021-05-25
Payer: MEDICAID

## 2021-05-25 VITALS
DIASTOLIC BLOOD PRESSURE: 80 MMHG | OXYGEN SATURATION: 97 % | HEIGHT: 66 IN | BODY MASS INDEX: 36.55 KG/M2 | TEMPERATURE: 98.8 F | RESPIRATION RATE: 18 BRPM | HEART RATE: 106 BPM | WEIGHT: 227.4 LBS | SYSTOLIC BLOOD PRESSURE: 134 MMHG

## 2021-05-25 DIAGNOSIS — I10 ESSENTIAL HYPERTENSION: ICD-10-CM

## 2021-05-25 DIAGNOSIS — E11.65 UNCONTROLLED TYPE 2 DIABETES MELLITUS WITH HYPERGLYCEMIA (HCC): Primary | ICD-10-CM

## 2021-05-25 DIAGNOSIS — E78.2 MIXED HYPERLIPIDEMIA: ICD-10-CM

## 2021-05-25 DIAGNOSIS — R80.1 PERSISTENT PROTEINURIA: ICD-10-CM

## 2021-05-25 LAB — HBA1C MFR BLD HPLC: 9.8 %

## 2021-05-25 PROCEDURE — 83036 HEMOGLOBIN GLYCOSYLATED A1C: CPT | Performed by: INTERNAL MEDICINE

## 2021-05-25 PROCEDURE — 99214 OFFICE O/P EST MOD 30 MIN: CPT | Performed by: INTERNAL MEDICINE

## 2021-05-25 RX ORDER — INSULIN GLARGINE 100 [IU]/ML
INJECTION, SOLUTION SUBCUTANEOUS
Qty: 30 ML | Refills: 5 | Status: SHIPPED | OUTPATIENT
Start: 2021-05-25 | End: 2021-08-13 | Stop reason: SDUPTHER

## 2021-05-25 NOTE — PATIENT INSTRUCTIONS
SPECIFIC INSTRUCTIONS BELOW       DRINK water   Do not skip meals  Do not eat in between meals    Reduce carbs- pasta, rice, potatoes, bread   Try to avoid processed bread products like BISCUITS, CROISSANTS, MUFFINS    Do not drink juices or sodas, even if they are calorie zero or diet drinks and especially avoid using powders like crystalloids , RONDA-AIDS     Do not eat peanut butter     Do not eat sugar free cookies and cakes   Do not eat peaches, oranges, pineapples, raisins, grapes , canteloupe , honey dew, mangoes , watermelon  and fruit medleys      ------------------------------------------------------------------------------------------------------------------------    Stay on  irbesartan 150  Mg a day      Start     on    LANTUS     60 units a day ( stop toujeo and tresiba as they are not being approved )   OR     Use NPH insulin [employer] brand at same doses        starlix  120 mg  3 times a day right before meals      Stay  On  jardiance  And  Metformin     Stay on  victoza at 1.2 mg a day       -------------------------------------------------------------------      Rui Owen - google up       V-go   - google up ( max basal  - 40 units  )      afrezza - google up  ( instead of starlix )  - might be a smarter option          humalog - new insulin  lyeumjuv  ( pens )                PAY ATTENTION TO THESE GENERAL INSTRUCTIONS     -ANY tests other than blood work, which you opt to do  outside the  [facility] imaging facilities, you are responsible for prior authorizations if  required    - HEALTH MAINTENANCE IS NOT GOING TO BE UP TO DATE ON YOUR AVS- PLEASE IGNORE   - YOUR MED LIST IS NOT UP TO DATE AS SOME CHANGES ARE BEING MADE AFTER THE VISIT - FOLLOW SPECIFIC INSTRUCTIONS  ABOVE     Results     *Normal results will not be notified by a phone call starting January 1 2021   *If you have an upcoming visit, the results will be discussed at the visit   *Please sign up for MY CHART if you want access to your lab and test results  *Abnormal results which require immediate attention will be notified by phone call   *Abnormal results which do not require immediate assistance will be notified in 1-2 weeks       Refills    -    have your pharmacy send us a refill request  Phone calls  -  Allow  24 hrs.  for non-urgent calls to be returned  Prior authorization - It may take 2-4 weeks to process  Forms  -  FMLA, DMV etc., will take up to 2 weeks to process  Cancellations - please notify the office 2 days in advance   Samples  - will only be dispensed at visits     --------------------------------------------------------------------------------------------

## 2021-05-25 NOTE — PROGRESS NOTES
HISTORY OF PRESENT ILLNESS  Ella Yeboah is a 39 y.o. female. 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 Current monitoring regimen: home blood tests - weekly      Review of Systems   Constitutional: Negative. Psychiatric/Behavioral: Negative. Physical Exam   Constitutional: She is oriented to person, place, and time. She appears well-developed and well-nourished. No new labs       ASSESSMENT and PLAN    1.   Type 2 DM, poorly controlled : a1c is  9.8 %      From    May 2021   compared to     8.6 %    From august 2020   Compared to  10. 5  %      From feb 2020    Compared  To     9.8 %   From  Oct 2019       May 2021     tresiba and  toujeo  Are   NOT being covered  ,   Gemma start on lantus   Or    NPH    Insulin    at 60 units a day   I stopped  humalog to make it simpler for her  And got her  on  starlix  120 mg  3 times a day right before meals    Stay  On  jardiance  And  Metformin   Stay  on victoza at 1.2 mg a day       August 2020    Stay on toujeo at 60 units a day   I stopped  humalog to make it simpler for her  And got her  on  starlix  120 mg  3 times a day right before meals    Stay  On  jardiance  And  Metformin   Stay on victoza at 1.2 mg a day     Today , I encouraged her to use Erin Show   I brought the discussion about resuming meal time insulin to achieve a goal of 7 % a1c   Gave all the names to google up       April 2020      Unable to comment on progress yet , but she lost weight   Verbal log : 187, 201 , 164 , 138 , 161     Stay on toujeo at 60 units a day   Stop humalog   Stay on  starlix  120 mg  3 times a day right before meals    Stay  On  jardiance  And  Metformin   Stay  on victoza at 1.2 mg a day         2. Hypoglycemia :  Educated on treating the hypoglycemia. Discussed INSULIn use and prescribed    3. HTN : stay  on irbesartan as proteinuria is noticeable     4. Dyslipidemia : LDL is 111, not on meds.      5. use of aspirin to prevent MI and TIA's discussed    Reviewed results with patient and discussed the labs being ordered today/bnv  Patient voiced understanding of plan of care

## 2021-05-25 NOTE — PROGRESS NOTES
Room 2    Identified pt with two pt identifiers(name and ). Reviewed record in preparation for visit and have obtained necessary documentation. All patient medications has been reviewed. Chief Complaint   Patient presents with    Diabetes       3 most recent PHQ Screens 2021   Little interest or pleasure in doing things Not at all   Feeling down, depressed, irritable, or hopeless Not at all   Total Score PHQ 2 0     Abuse Screening Questionnaire 2021   Do you ever feel afraid of your partner? N   Are you in a relationship with someone who physically or mentally threatens you? N   Is it safe for you to go home? Y       Health Maintenance Review: Patient reminded of \"due or due soon\" health maintenance. I have asked the patient to contact his/her primary care provider (PCP) for follow-up on his/her health maintenance. Vitals:    21 1608   BP: 134/80   Pulse: (!) 106   Resp: 18   Temp: 98.8 °F (37.1 °C)   TempSrc: Oral   SpO2: 97%   Weight: 227 lb 6.4 oz (103.1 kg)   Height: 5' 6\" (1.676 m)   PainSc:   0 - No pain       Wt Readings from Last 3 Encounters:   21 227 lb 6.4 oz (103.1 kg)   20 221 lb 3.2 oz (100.3 kg)   20 228 lb 14.4 oz (103.8 kg)     Temp Readings from Last 3 Encounters:   21 98.8 °F (37.1 °C) (Oral)   20 99 °F (37.2 °C) (Oral)   20 98.1 °F (36.7 °C) (Oral)     BP Readings from Last 3 Encounters:   21 134/80   20 127/70   20 145/87     Pulse Readings from Last 3 Encounters:   21 (!) 106   20 (!) 113   20 60       Lab Results   Component Value Date/Time    Hemoglobin A1c 8.6 (H) 08/15/2020 07:41 AM    Hemoglobin A1c, External 9.8 10/01/2019 12:00 AM       Coordination of Care Questionnaire:   1) Have you been to an emergency room, urgent care, or hospitalized since your last visit?   no       2. Have seen or consulted any other health care provider since your last visit?  NO    Advance Care Planning:   End of Life Planning: has NO advanced directive - not interested in additional information    Patient is accompanied by self I have received verbal consent from Jaquan Duenas to discuss any/all medical information while they are present in the room.

## 2021-05-25 NOTE — LETTER
5/31/2021 Patient: Iva Morrison YOB: 1985 Date of Visit: 5/25/2021 Jimmy Iyer NP 
[address] [facility] [address] Via Fax: [phone] Dear Jimmy Iyer NP, Thank you for referring Ms. Chevy De Paz to [address] [place] [address] for evaluation. My notes for this consultation are attached. If you have questions, please do not hesitate to call me. I look forward to following your patient along with you. Sincerely, Davin Cavazos MD

## 2021-08-13 DIAGNOSIS — E11.65 UNCONTROLLED TYPE 2 DIABETES MELLITUS WITH HYPERGLYCEMIA (HCC): ICD-10-CM

## 2021-08-13 RX ORDER — METFORMIN HYDROCHLORIDE 1000 MG/1
1000 TABLET ORAL 2 TIMES DAILY WITH MEALS
Qty: 60 TABLET | Refills: 6 | Status: SHIPPED | OUTPATIENT
Start: 2021-08-13 | End: 2021-08-27 | Stop reason: SDUPTHER

## 2021-08-13 RX ORDER — NATEGLINIDE 120 MG/1
120 TABLET ORAL
Qty: 90 TABLET | Refills: 6 | Status: SHIPPED | OUTPATIENT
Start: 2021-08-13 | End: 2021-08-27 | Stop reason: ALTCHOICE

## 2021-08-13 RX ORDER — INSULIN GLARGINE 100 [IU]/ML
INJECTION, SOLUTION SUBCUTANEOUS
Qty: 30 ML | Refills: 6 | Status: SHIPPED | OUTPATIENT
Start: 2021-08-13 | End: 2021-08-27 | Stop reason: SDUPTHER

## 2021-08-13 RX ORDER — IRBESARTAN 150 MG/1
150 TABLET ORAL DAILY
Qty: 30 TABLET | Refills: 6 | Status: SHIPPED | OUTPATIENT
Start: 2021-08-13 | End: 2022-06-24 | Stop reason: SDUPTHER

## 2021-08-13 RX ORDER — PEN NEEDLE, DIABETIC 31 GX3/16"
NEEDLE, DISPOSABLE MISCELLANEOUS
Qty: 100 PEN NEEDLE | Refills: 3 | Status: SHIPPED | OUTPATIENT
Start: 2021-08-13 | End: 2021-08-27 | Stop reason: SDUPTHER

## 2021-08-18 LAB
ALBUMIN SERPL-MCNC: 4.2 G/DL (ref 3.8–4.8)
ALBUMIN/GLOB SERPL: 1.4 {RATIO} (ref 1.2–2.2)
ALP SERPL-CCNC: 91 IU/L (ref 48–121)
ALT SERPL-CCNC: 15 IU/L (ref 0–32)
AST SERPL-CCNC: 13 IU/L (ref 0–40)
BILIRUB SERPL-MCNC: 0.2 MG/DL (ref 0–1.2)
BUN SERPL-MCNC: 14 MG/DL (ref 6–20)
BUN/CREAT SERPL: 25 (ref 9–23)
C PEPTIDE SERPL-MCNC: 3.6 NG/ML (ref 1.1–4.4)
CALCIUM SERPL-MCNC: 9.4 MG/DL (ref 8.7–10.2)
CHLORIDE SERPL-SCNC: 102 MMOL/L (ref 96–106)
CHOLEST SERPL-MCNC: 191 MG/DL (ref 100–199)
CO2 SERPL-SCNC: 19 MMOL/L (ref 20–29)
CREAT SERPL-MCNC: 0.56 MG/DL (ref 0.57–1)
EST. AVERAGE GLUCOSE BLD GHB EST-MCNC: 278 MG/DL
GAD65 AB SER IA-ACNC: <5 U/ML (ref 0–5)
GLOBULIN SER CALC-MCNC: 3.1 G/DL (ref 1.5–4.5)
GLUCOSE SERPL-MCNC: 147 MG/DL (ref 65–99)
HBA1C MFR BLD: 11.3 % (ref 4.8–5.6)
HDLC SERPL-MCNC: 45 MG/DL
IMP & REVIEW OF LAB RESULTS: NORMAL
LDLC SERPL CALC-MCNC: 90 MG/DL (ref 0–99)
POTASSIUM SERPL-SCNC: 4.1 MMOL/L (ref 3.5–5.2)
PROT SERPL-MCNC: 7.3 G/DL (ref 6–8.5)
SODIUM SERPL-SCNC: 136 MMOL/L (ref 134–144)
TRIGL SERPL-MCNC: 342 MG/DL (ref 0–149)
VLDLC SERPL CALC-MCNC: 56 MG/DL (ref 5–40)

## 2021-08-27 ENCOUNTER — OFFICE VISIT (OUTPATIENT)
Dept: ENDOCRINOLOGY | Age: 36
End: 2021-08-27
Payer: MEDICAID

## 2021-08-27 VITALS
OXYGEN SATURATION: 98 % | TEMPERATURE: 97.6 F | WEIGHT: 228 LBS | DIASTOLIC BLOOD PRESSURE: 87 MMHG | HEIGHT: 66 IN | BODY MASS INDEX: 36.64 KG/M2 | SYSTOLIC BLOOD PRESSURE: 140 MMHG | HEART RATE: 112 BPM | RESPIRATION RATE: 18 BRPM

## 2021-08-27 DIAGNOSIS — E11.65 UNCONTROLLED TYPE 2 DIABETES MELLITUS WITH HYPERGLYCEMIA (HCC): ICD-10-CM

## 2021-08-27 DIAGNOSIS — R80.1 PERSISTENT PROTEINURIA: ICD-10-CM

## 2021-08-27 DIAGNOSIS — I10 ESSENTIAL HYPERTENSION: ICD-10-CM

## 2021-08-27 DIAGNOSIS — E11.65 UNCONTROLLED TYPE 2 DIABETES MELLITUS WITH HYPERGLYCEMIA (HCC): Primary | ICD-10-CM

## 2021-08-27 DIAGNOSIS — E78.2 MIXED HYPERLIPIDEMIA: ICD-10-CM

## 2021-08-27 PROCEDURE — 99215 OFFICE O/P EST HI 40 MIN: CPT | Performed by: INTERNAL MEDICINE

## 2021-08-27 RX ORDER — PEN NEEDLE, DIABETIC 31 GX3/16"
NEEDLE, DISPOSABLE MISCELLANEOUS
Qty: 400 PEN NEEDLE | Refills: 3 | Status: SHIPPED | OUTPATIENT
Start: 2021-08-27 | End: 2022-10-24 | Stop reason: SDUPTHER

## 2021-08-27 RX ORDER — METFORMIN HYDROCHLORIDE 1000 MG/1
1000 TABLET ORAL 2 TIMES DAILY WITH MEALS
Qty: 60 TABLET | Refills: 6 | Status: SHIPPED | OUTPATIENT
Start: 2021-08-27 | End: 2022-07-20 | Stop reason: SDUPTHER

## 2021-08-27 RX ORDER — INSULIN GLARGINE 100 [IU]/ML
INJECTION, SOLUTION SUBCUTANEOUS
Qty: 30 ML | Refills: 6 | Status: SHIPPED | OUTPATIENT
Start: 2021-08-27 | End: 2022-06-29 | Stop reason: CLARIF

## 2021-08-27 RX ORDER — INSULIN ASPART 100 [IU]/ML
INJECTION, SOLUTION INTRAVENOUS; SUBCUTANEOUS
Qty: 15 ML | Refills: 6 | Status: SHIPPED | OUTPATIENT
Start: 2021-08-27 | End: 2021-12-22

## 2021-08-27 NOTE — PROGRESS NOTES
1. Have you been to the ER, urgent care clinic since your last visit? No  Hospitalized since your last visit? No    2. Have you seen or consulted any other health care providers outside of the [address] [place] [address] since your last visit? Include any pap smears or colon screening.  No

## 2021-08-27 NOTE — PATIENT INSTRUCTIONS
SPECIFIC INSTRUCTIONS BELOW       DRINK water   Do not skip meals  Do not eat in between meals    Reduce carbs- pasta, rice, potatoes, bread   Try to avoid processed bread products like BISCUITS, CROISSANTS, MUFFINS    Do not drink juices or sodas, even if they are calorie zero or diet drinks and especially avoid using powders like crystalloids , RONDA-AIDS     Do not eat peanut butter     Do not eat sugar free cookies and cakes   Do not eat peaches, oranges, pineapples, raisins, grapes , canteloupe , honey dew, mangoes , watermelon  and fruit medleys      ------------------------------------------------------------------------------------------------------------------------      Stay  On  jardiance  And  Metformin     Stay on  victoza at 1.2 mg a day         Stay     on    LANTUS     60 units a day     STOP   starlix      Start on novolog  8 units before each meal and add  additional novolog  as follows with meals:    150-200 mg 2 units    201-250 mg 4 units    251-300 mg 6 units    301-350 mg 8 units    351-400 mg 10 units    401-450 mg 12 units    451-500 mg 14 units    Less than 70 mg  - no insulin           -------------PAY ATTENTION TO THESE GENERAL INSTRUCTIONS -----------------    -ANY tests other than blood work, which you opt to do  outside the  [facility] facilities, you are responsible for prior authorizations if  required    - HEALTH MAINTENANCE IS NOT GOING TO BE UP TO DATE ON YOUR AVS- PLEASE IGNORE   - YOUR MED LIST IS NOT UP TO DATE AS SOME CHANGES ARE BEING MADE AFTER THE VISIT - [address] [place] [address] Street     Results     *Normal results will not be notified by a phone call starting January 1 2021   *If you have an upcoming visit, the results will be discussed at the visit   *Please sign up for MY CHART if you want access to your lab and test results  *Abnormal results which require immediate attention will be notified by phone call   *Abnormal results which do not require immediate assistance will be notified in 1-2 weeks       Refills    -    have your pharmacy send us a refill request . Refills are done max for one year and a visit is a must before refills are extended    Follow up appointments -  highly encourage you to make it when you are checking out. We can accommodate you into the schedule based on your clinical situation, but not for extending refills beyond a year. Labs are important to give refills and is important to get labs before the visit     Phone calls  -  Allow  24 hrs.  for non-urgent calls to be returned  Prior authorization - It may take 2-4 weeks to process  Forms  -  FMLA, DMV etc., will take up to 2 weeks to process  Cancellations - please notify the office 2 days in advance   Samples  - will only be dispensed at visits       If not showing for the appointments and cancelling appointments within 24 hours are kept track of and three  of such situations in  two consecutive years will likely be considered for termination from the practice    -------------------------------------------------------------------------------------------------------------------

## 2021-08-27 NOTE — LETTER
8/29/2021    Patient: Juany First   YOB: 1985   Date of Visit: 8/27/2021     Lizette Rao NP  [address]  [place] [address] [facility] [address]  Via Fax: [phone]    Dear Lizette Rao NP,      Thank you for referring Ms. Jade Bolden to [address] [place] [address] for evaluation. My notes for this consultation are attached. If you have questions, please do not hesitate to call me. I look forward to following your patient along with you.       Sincerely,    Ban Nathan MD

## 2021-08-27 NOTE — PROGRESS NOTES
HISTORY OF PRESENT ILLNESS  Bree Ray is a 39 y.o. female. HPI  Patient here for  follow up after  Last  visit of Type 2 diabetes mellitus   From  May 2021     Gained 1 lb   She has missed lantus for 2 weeks         May 2021     A GAP of  9 months   She had many family members lost in the family   Caring for other children and hers   There has been [employer] issues with prior auth etc.,         August 2020      Pt feels that she has done better on sugar s  No meter though   Pt works at a medical office as , and stays pretty busy she says to check sugars         Old history      Referred : by self/pcp  H/o diabetes for 15  years   Current A1C is 9.8 %  From oct 2019  and symptoms/problems include polyuria and polydipsia   Current diabetic medications include jardiance , lantus and humalog  And  Metformin 1 gm bid twice a day . Current monitoring regimen: home blood tests - weekly      Review of Systems   Constitutional: Negative. Psychiatric/Behavioral: Negative. Physical Exam   Constitutional: She is oriented to person, place, and time. She appears well-developed and well-nourished. No new labs       ASSESSMENT and PLAN    1.   Type 2 DM, poorly controlled : a1c is   11 %   From     August 2021   Compared to    9.8 %      From    May 2021   compared to     8.6 %    From august 2020   Compared to  10. 5  %      From feb 2020    Compared  To     9.8 %   From  Oct 2019         August 2021     Not in control   discrepancy between  a1c   And the LOG     She is checkign 3 times a day   And  For this kind of checks, I would expect a1c be down to 8 %  atleast     Changing starlix to meal time insulin   She  did not  resist at all  For the change, she has to take 4 insulin shots a day   Likely I was preparing her for this   Stay  On  jardiance  And  Metformin   Stay  on victoza at 1.2 mg a day         May 2021     tresiba and  toujeo  Are   NOT being covered  ,   Andrés start on lantus   Or    NPH    Insulin    at 60 units a day   I stopped  humalog to make it simpler for her  And got her  on  starlix  120 mg  3 times a day right before meals    Stay  On  jardiance  And  Metformin   Stay  on victoza at 1.2 mg a day       August 2020    Stay on toujeo at 60 units a day   I stopped  humalog to make it simpler for her  And got her  on  starlix  120 mg  3 times a day right before meals    Stay  On  jardiance  And  Metformin   Stay  on victoza at 1.2 mg a day     Today , I encouraged her to use Marcela Lied   I brought the discussion about resuming meal time insulin to achieve a goal of 7 % a1c   Gave all the names to google up     2. Hypoglycemia :  Educated on treating the hypoglycemia. Discussed INSULIn use and prescribed    3. HTN : stay  on irbesartan as proteinuria is noticeable     4. Dyslipidemia : LDL is 111, not on meds.      5. use of aspirin to prevent MI and TIA's discussed    Reviewed results with patient and discussed the labs being ordered today/bnv  Patient voiced understanding of plan of care

## 2021-11-29 LAB
ALBUMIN SERPL-MCNC: 3.9 G/DL (ref 3.8–4.8)
ALBUMIN/CREAT UR: 42 MG/G CREAT (ref 0–29)
ALBUMIN/GLOB SERPL: 1.1 {RATIO} (ref 1.2–2.2)
ALP SERPL-CCNC: 88 IU/L (ref 44–121)
ALT SERPL-CCNC: 11 IU/L (ref 0–32)
AST SERPL-CCNC: 10 IU/L (ref 0–40)
BILIRUB SERPL-MCNC: 0.3 MG/DL (ref 0–1.2)
BUN SERPL-MCNC: 12 MG/DL (ref 6–20)
BUN/CREAT SERPL: 21 (ref 9–23)
CALCIUM SERPL-MCNC: 9.2 MG/DL (ref 8.7–10.2)
CHLORIDE SERPL-SCNC: 103 MMOL/L (ref 96–106)
CHOLEST SERPL-MCNC: 161 MG/DL (ref 100–199)
CO2 SERPL-SCNC: 20 MMOL/L (ref 20–29)
CREAT SERPL-MCNC: 0.56 MG/DL (ref 0.57–1)
CREAT UR-MCNC: 112.9 MG/DL
EST. AVERAGE GLUCOSE BLD GHB EST-MCNC: 186 MG/DL
GLOBULIN SER CALC-MCNC: 3.4 G/DL (ref 1.5–4.5)
GLUCOSE SERPL-MCNC: 97 MG/DL (ref 65–99)
HBA1C MFR BLD: 8.1 % (ref 4.8–5.6)
HDLC SERPL-MCNC: 50 MG/DL
IMP & REVIEW OF LAB RESULTS: NORMAL
LDLC SERPL CALC-MCNC: 86 MG/DL (ref 0–99)
MICROALBUMIN UR-MCNC: 47.9 UG/ML
POTASSIUM SERPL-SCNC: 4.2 MMOL/L (ref 3.5–5.2)
PROT SERPL-MCNC: 7.3 G/DL (ref 6–8.5)
SODIUM SERPL-SCNC: 138 MMOL/L (ref 134–144)
TRIGL SERPL-MCNC: 143 MG/DL (ref 0–149)
VLDLC SERPL CALC-MCNC: 25 MG/DL (ref 5–40)

## 2021-12-22 ENCOUNTER — OFFICE VISIT (OUTPATIENT)
Dept: ENDOCRINOLOGY | Age: 36
End: 2021-12-22
Payer: MEDICAID

## 2021-12-22 VITALS
RESPIRATION RATE: 18 BRPM | WEIGHT: 232.2 LBS | OXYGEN SATURATION: 97 % | BODY MASS INDEX: 37.32 KG/M2 | TEMPERATURE: 98.2 F | DIASTOLIC BLOOD PRESSURE: 78 MMHG | SYSTOLIC BLOOD PRESSURE: 132 MMHG | HEIGHT: 66 IN | HEART RATE: 104 BPM

## 2021-12-22 DIAGNOSIS — I10 ESSENTIAL HYPERTENSION: ICD-10-CM

## 2021-12-22 DIAGNOSIS — E78.2 MIXED HYPERLIPIDEMIA: ICD-10-CM

## 2021-12-22 DIAGNOSIS — E11.65 UNCONTROLLED TYPE 2 DIABETES MELLITUS WITH HYPERGLYCEMIA (HCC): Primary | ICD-10-CM

## 2021-12-22 PROCEDURE — 99214 OFFICE O/P EST MOD 30 MIN: CPT | Performed by: INTERNAL MEDICINE

## 2021-12-22 PROCEDURE — 3052F HG A1C>EQUAL 8.0%<EQUAL 9.0%: CPT | Performed by: INTERNAL MEDICINE

## 2021-12-22 RX ORDER — NATEGLINIDE 120 MG/1
120 TABLET ORAL
Qty: 270 TABLET | Refills: 5 | Status: SHIPPED | OUTPATIENT
Start: 2021-12-22

## 2021-12-22 NOTE — PATIENT INSTRUCTIONS
SPECIFIC INSTRUCTIONS BELOW       DRINK water   Do not skip meals  Do not eat in between meals    Reduce carbs- pasta, rice, potatoes, bread   Try to avoid processed bread products like BISCUITS, CROISSANTS, MUFFINS    Do not drink juices or sodas, even if they are calorie zero or diet drinks and especially avoid using powders like crystalloids , RONDA-AIDS     Do not eat peanut butter     Do not eat sugar free cookies and cakes   Do not eat peaches, oranges, pineapples, raisins, grapes , canteloupe , honey dew, mangoes , watermelon  and fruit medleys      ------------------------------------------------------------------------------------------------------------------------      Stay  On  jardiance  And  Metformin     Stay on  victoza at 1.2 mg a day     Stay     on    LANTUS     60 units a day     Stay on    starlix      Less than 70 mg  - no insulin         -------------PAY ATTENTION TO THESE GENERAL INSTRUCTIONS -----------------      - The medications prescribed at this visit will not be available at pharmacy until 6 pm       - YOUR MED LIST IS NOT UP TO DATE AS SOME CHANGES ARE BEING MADE AFTER THE VISIT - FOLLOW SPECIFIC INSTRUCTIONS  ABOVE     -ANY tests other than blood work, which you opt to do  outside the  [facility] facilities, you are responsible for prior authorizations if  required    - 18 Eileen Quintero UP TO DATE ON YOUR AVS- PLEASE IGNORE     Results     *Normal results will not be notified by a phone call starting January 1 2021   *If you have an upcoming visit, the results will be discussed at the visit   *Please sign up for MY CHART if you want access to your lab and test results  *Abnormal results which require immediate attention will be notified by phone call   *Abnormal results which do not require immediate assistance will be notified in 1-2 weeks       Refills    -    have your pharmacy send us a refill request . Refills are done max for one year and a visit is a must before refills are extended    Follow up appointments -  highly encourage you to make it when you are checking out. We can accommodate you into the schedule based on your clinical situation, but not for extending refills beyond a year. Labs are important to give refills and is important to get labs before the visit     Phone calls  -  Allow  24 hrs.  for non-urgent calls to be returned  Prior authorization - It may take 2-4 weeks to process  Forms  -  FMLA, DMV etc., will take up to 2 weeks to process  Cancellations - please notify the office 2 days in advance   Samples  - will only be dispensed at visits       If not showing for the appointments and cancelling appointments within 24 hours are kept track of and three  of such situations in  two consecutive years will likely be considered for termination from the practice    -------------------------------------------------------------------------------------------------------------------

## 2021-12-22 NOTE — PROGRESS NOTES
HISTORY OF PRESENT ILLNESS  Jodi Hawk is a 39 y.o. female. 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 Current monitoring regimen: home blood tests - weekly      Review of Systems   Constitutional: Negative. Psychiatric/Behavioral: Negative. Physical Exam   Constitutional: She is oriented to person, place, and time. She appears well-developed and well-nourished.          Lab Results   Component Value Date/Time    Hemoglobin A1c 8.1 (H) 11/27/2021 08:38 AM    Hemoglobin A1c 11.3 (H) 08/14/2021 08:31 AM    Hemoglobin A1c 8.6 (H) 08/15/2020 07:41 AM    Hemoglobin A1c, External 9.8 10/01/2019 12:00 AM    Glucose 97 11/27/2021 08:38 AM    Microalb/Creat ratio (ug/mg creat.) 42 (H) 11/27/2021 08:38 AM    LDL, calculated 86 11/27/2021 08:38 AM    LDL, calculated 111 (H) 08/15/2020 07:41 AM    Creatinine 0.56 (L) 11/27/2021 08:38 AM      Lab Results   Component Value Date/Time    Cholesterol, total 161 11/27/2021 08:38 AM    HDL Cholesterol 50 11/27/2021 08:38 AM    LDL, calculated 86 11/27/2021 08:38 AM    LDL, calculated 111 (H) 08/15/2020 07:41 AM    Triglyceride 143 11/27/2021 08:38 AM     Lab Results   Component Value Date/Time    ALT (SGPT) 11 11/27/2021 08:38 AM    Alk. phosphatase 88 11/27/2021 08:38 AM    Bilirubin, total 0.3 11/27/2021 08:38 AM    Albumin 3.9 11/27/2021 08:38 AM    Protein, total 7.3 11/27/2021 08:38 AM     Lab Results   Component Value Date/Time    GFR est non- 11/27/2021 08:38 AM    GFR est  11/27/2021 08:38 AM    Creatinine 0.56 (L) 11/27/2021 08:38 AM    BUN 12 11/27/2021 08:38 AM    Sodium 138 11/27/2021 08:38 AM    Potassium 4.2 11/27/2021 08:38 AM    Chloride 103 11/27/2021 08:38 AM    CO2 20 11/27/2021 08:38 AM     ASSESSMENT and PLAN    1.   Type 2 DM, poorly controlled : a1c is   8.1 %    From     Nov 2021    Compared to    11 %   From     August 2021   Compared to    9.8 %      From    May 2021   compared to     8.6 %    From august 2020   Compared to  10. 5  %      From feb 2020    Compared  To     9.8 %   From  Oct 2019       Dec 2021     Improved glycemic control   She has done better she agrees with diet   Reviewed the log , sugars are good   Stay  On  jardiance  And  Metformin   Stay  on victoza at 1.2 mg a day   Stay on basal insulin , lantus   She decided not to [address], but continuing on starlix     c-pep measurable       August 2021     Not in control   discrepancy between  a1c   And the LOG     She is checkign 3 times a day   And  For this kind of checks, I would expect a1c be down to 8 %  atleast     Changing starlix to meal time insulin   She  did not  resist at all  For the change, she has to take 4 insulin shots a day   Likely I was preparing her for this   Stay  On  jardiance  And  Metformin   Stay  on victoza at 1.2 mg a day         May 2021     tresiba and  toujeo  Are   NOT being covered  ,   Sill start on lantus   Or    NPH    Insulin    at 60 units a day   I stopped  humalog to make it simpler for her  And got her  on  starlix  120 mg  3 times a day right before meals    Stay  On  jardiance And  Metformin   Stay  on victoza at 1.2 mg a day         2. Hypoglycemia :  Educated on treating the hypoglycemia. Discussed INSULIn use and prescribed    3. HTN : stay  on irbesartan as proteinuria is noticeable     4. Dyslipidemia : LDL is <100  not on meds.      5. use of aspirin to prevent MI and TIA's discussed      F/u in 6 month     Reviewed results with patient and discussed the labs being ordered today/bnv  Patient voiced understanding of plan of care

## 2021-12-22 NOTE — PROGRESS NOTES
Room 1    Identified pt with two pt identifiers(name and ). Reviewed record in preparation for visit and have obtained necessary documentation. All patient medications has been reviewed. Chief Complaint   Patient presents with    Diabetes       3 most recent PHQ Screens 2021   Little interest or pleasure in doing things Not at all   Feeling down, depressed, irritable, or hopeless Not at all   Total Score PHQ 2 0         Health Maintenance Review: Patient reminded of \"due or due soon\" health maintenance. I have asked the patient to contact his/her primary care provider (PCP) for follow-up on his/her health maintenance. Vitals:    21 1517   BP: 132/78   Pulse: (!) 104   Resp: 18   Temp: 98.2 °F (36.8 °C)   TempSrc: Temporal   SpO2: 97%   Weight: 232 lb 3.2 oz (105.3 kg)   Height: 5' 6\" (1.676 m)   PainSc:   0 - No pain         Lab Results   Component Value Date/Time    Hemoglobin A1c 8.1 (H) 2021 08:38 AM    Hemoglobin A1c (POC) 9.8 2021 04:19 PM    Hemoglobin A1c, External 9.8 10/01/2019 12:00 AM       Coordination of Care Questionnaire:   1) Have you been to an emergency room, urgent care, or hospitalized since your last visit?   no       2. Have seen or consulted any other health care provider since your last visit? NO      Patient is accompanied by self I have received verbal consent from Cat So to discuss any/all medical information while they are present in the room.

## 2021-12-22 NOTE — LETTER
12/22/2021    Patient: Akira Kenyon   YOB: 1985   Date of Visit: 12/22/2021     Sherwin Carey NP  [address]  [facility] [address]  Via Fax: [phone]    Dear Sherwin Carey NP,      Thank you for referring Ms. Irvin Anders to [address] [place] [address] for evaluation. My notes for this consultation are attached. If you have questions, please do not hesitate to call me. I look forward to following your patient along with you.       Sincerely,    Melinda Ambrocio MD

## 2022-03-28 DIAGNOSIS — E11.65 UNCONTROLLED TYPE 2 DIABETES MELLITUS WITH HYPERGLYCEMIA (HCC): ICD-10-CM

## 2022-05-16 ENCOUNTER — OFFICE VISIT (OUTPATIENT)
Dept: OBGYN CLINIC | Age: 37
End: 2022-05-16
Payer: COMMERCIAL

## 2022-05-16 VITALS
RESPIRATION RATE: 16 BRPM | OXYGEN SATURATION: 96 % | DIASTOLIC BLOOD PRESSURE: 85 MMHG | SYSTOLIC BLOOD PRESSURE: 143 MMHG | HEART RATE: 104 BPM | BODY MASS INDEX: 38.82 KG/M2 | WEIGHT: 233 LBS | HEIGHT: 65 IN

## 2022-05-16 DIAGNOSIS — N83.299 COMPLEX OVARIAN CYST: Primary | ICD-10-CM

## 2022-05-16 PROCEDURE — 99203 OFFICE O/P NEW LOW 30 MIN: CPT | Performed by: OBSTETRICS & GYNECOLOGY

## 2022-05-16 RX ORDER — ACETAMINOPHEN AND CODEINE PHOSPHATE 120; 12 MG/5ML; MG/5ML
1 SOLUTION ORAL
COMMUNITY

## 2022-05-16 NOTE — PROGRESS NOTES
Bree Ray is a 40 y.o. female, , Patient's last menstrual period was 2022., who presents today for the following:  Chief Complaint   Patient presents with    New Patient     Pt c/o pain on R side and cyst.        No Known Allergies    Current Outpatient Medications   Medication Sig    norethindrone (MICRONOR) 0.35 mg tab Take 1 Tablet by mouth.  liraglutide (VICTOZA) 0.6 mg/0.1 mL (18 mg/3 mL) pnij Inject 1.2mg daily    nateglinide (STARLIX) 120 mg tablet Take 1 Tablet by mouth Before breakfast, lunch, and dinner.  empagliflozin (Jardiance) 25 mg tablet Take 1 Tablet by mouth daily.  metFORMIN (GLUCOPHAGE) 1,000 mg tablet Take 1 Tablet by mouth two (2) times daily (with meals).  insulin glargine (LANTUS,BASAGLAR) 100 unit/mL (3 mL) inpn Inject 60 units at bedtime. Stop Cruz Melendez and Toujeo    irbesartan (AVAPRO) 150 mg tablet Take 1 Tablet by mouth daily.  Insulin Needles, Disposable, (Ginny Pen Needle) 32 gauge x \" ndle Use to inject insulin four times daily. Dx. Code E11.65    etonogestreL (Nexplanon) 68 mg impl by SubDERmal route. (Patient not taking: Reported on 2022)     No current facility-administered medications for this visit.        Past Medical History:   Diagnosis Date    [facility] [address]     History of MRI 2021    negative breast MRI    [facility]var[facility] 2021    [facility]    Proliferative diabetic retinopathy ([facility] [address]     Retinal detachment 2017    left eye       Past Surgical History:   Procedure Laterality Date    HX ANKLE FRACTURE TX      HX  SECTION      HX RETINAL DETACHMENT REPAIR  2017    HX TONSILLECTOMY         Family History   Problem Relation Age of Onset    Hypertension Mother     Diabetes Father     Cancer Sister         breast cancer    Breast Cancer Sister     No Known Problems Daughter     Breast Cancer Sister        Social History     Socioeconomic History    Marital status:      Spouse name: Not on file    Number of children: Not on file    Years of education: Not on file    Highest education level: Not on file   Occupational History    Not on file   Tobacco Use    Smoking status: Never Smoker    Smokeless tobacco: Never Used   Substance and Sexual Activity    Alcohol use: Never    Drug use: Never    Sexual activity: Yes     Partners: Male     Birth control/protection: Pill   Other Topics Concern    Not on file   Social History Narrative    Not on file     Social Determinants of Health     Financial Resource Strain:     Difficulty of Paying Living Expenses: Not on file   Food Insecurity:     Worried About Running Out of Food in the Last Year: Not on file    Rian of Food in the Last Year: Not on file   Transportation Needs:     Lack of Transportation (Medical): Not on file    Lack of Transportation (Non-Medical): Not on file   Physical Activity:     Days of Exercise per Week: Not on file    Minutes of Exercise per Session: Not on file   Stress:     Feeling of Stress : Not on file   Social Connections:     Frequency of Communication with Friends and Family: Not on file    Frequency of Social Gatherings with Friends and Family: Not on file    Attends Christianity Services: Not on file    Active Member of [address] [place] [address] [employer] or Organizations: Not on file    Attends Club or Organization Meetings: Not on file    Marital Status: Not on file   Intimate Partner Violence:     Fear of Current or Ex-Partner: Not on file    Emotionally Abused: Not on file    Physically Abused: Not on file    Sexually Abused: Not on file   Housing Stability:     Unable to Pay for Housing in the Last Year: Not on file    Number of [place] in the Last Year: Not on file    Unstable Housing in the Last Year: Not on file         HPI    HPI  Reported by patient.  Location: pelvis   Onset/Timing: > 6 months  Quality: aching; cramping; pressure; fullness/bloating   Severity: interferes with normal activities   Dx with bilateral ovarian cyst by previous gyn   Last ultrasound revealed :    Right ovary with complex cystic structure with sold material measuring 5x 4.2x 3.2 cm  Left ovary with complex structure with septations measuring 3.1 x 3x 2.4 cm  No flow noted within either structure/    Uterus measuring  9.0x6.3x5.2cm    Associated Symptoms: no back pain; no chills; no constipation; no diarrhea; no vaginal discharge; no pain with urination; normal emptying of bladder; no feelings of urgency; no blood in the urine; normal libido; no fever; no nausea; no vomiting;; no urinary frequency; no vaginal itching or irritation; no dyspareunia; Tumor markers were also obtained revealing    Previous ca 125 18.7  AFP 1.9  Inhibin <7.0  Inhibin a 3.0    Patient desires definitve treatment as symptoms have been present > 6 months without resolution    Review of Systems   Constitutional: Negative. Respiratory: Negative. Cardiovascular: Negative. Gastrointestinal: Negative. Genitourinary: Negative. Musculoskeletal: Negative. Skin: Negative. Neurological: Negative. Endo/Heme/Allergies: Negative. Psychiatric/Behavioral: Negative. All other systems reviewed and are negative. BP (!) 143/85 (BP 1 Location: Right arm, BP Patient Position: Sitting)   Pulse (!) 104   Resp 16   Ht 5' 5\" (1.651 m)   Wt 233 lb (105.7 kg)   LMP 05/11/2022   SpO2 96%   BMI 38.77 kg/m²    OBGyn Exam     PE:  Constitutional: General Appearance: healthy-appearing, well-nourished, well-developed, and well groomed. Psychiatric: Orientation: to time, place, and person. Mood and Affect: normal mood and affect and appropriate and active and alert.               1. Complex ovarian cyst      Review of previous medical records  Discussed with patient management of ovarian cyst  Surgery steps reviewed  All questions answered      - CANCER ANTIGEN 125  - INHIBIN A  - INHIBIN B  - CEA  - AFP TETRA SCREEN, OBSTETRICAL  - US PELV NON OBS; Future  - HUMAN EPIDIDYMIS PROTEIN 4

## 2022-05-19 LAB
AFP-TM SERPL-MCNC: 2.3 NG/ML (ref 0–6.4)
CANCER AG125 SERPL-ACNC: 14.3 U/ML (ref 0–38.1)
CEA SERPL-MCNC: 1 NG/ML (ref 0–4.7)
HE4 SERPL-SCNC: 34.9 PMOL/L (ref 0–61.2)
INHIBIN A SERPL-MCNC: 13.5 PG/ML
INHIBIN B SERPL-MCNC: <7 PG/ML

## 2022-06-07 ENCOUNTER — TELEPHONE (OUTPATIENT)
Dept: OBGYN CLINIC | Age: 37
End: 2022-06-07

## 2022-06-07 NOTE — TELEPHONE ENCOUNTER
Per Dr Marjan Bustos, patient notified she no longer has a left ovarian cyst and the right ovarian cyst is now a simple cyst vs a complex cyst.  She has been scheduled for a repeat ultrasound on 09/09/22. She states she is experiencing some mild pain off and on.

## 2022-06-24 ENCOUNTER — OFFICE VISIT (OUTPATIENT)
Dept: ENDOCRINOLOGY | Age: 37
End: 2022-06-24
Payer: COMMERCIAL

## 2022-06-24 VITALS
DIASTOLIC BLOOD PRESSURE: 82 MMHG | BODY MASS INDEX: 39.58 KG/M2 | HEIGHT: 65 IN | WEIGHT: 237.6 LBS | HEART RATE: 100 BPM | OXYGEN SATURATION: 94 % | TEMPERATURE: 98.2 F | SYSTOLIC BLOOD PRESSURE: 142 MMHG

## 2022-06-24 DIAGNOSIS — E78.2 MIXED HYPERLIPIDEMIA: ICD-10-CM

## 2022-06-24 DIAGNOSIS — I10 ESSENTIAL HYPERTENSION: ICD-10-CM

## 2022-06-24 DIAGNOSIS — R80.1 PERSISTENT PROTEINURIA: ICD-10-CM

## 2022-06-24 DIAGNOSIS — E11.65 UNCONTROLLED TYPE 2 DIABETES MELLITUS WITH HYPERGLYCEMIA (HCC): ICD-10-CM

## 2022-06-24 DIAGNOSIS — E11.65 UNCONTROLLED TYPE 2 DIABETES MELLITUS WITH HYPERGLYCEMIA (HCC): Primary | ICD-10-CM

## 2022-06-24 LAB — HBA1C MFR BLD HPLC: 8.8 %

## 2022-06-24 PROCEDURE — 83036 HEMOGLOBIN GLYCOSYLATED A1C: CPT | Performed by: INTERNAL MEDICINE

## 2022-06-24 PROCEDURE — 3052F HG A1C>EQUAL 8.0%<EQUAL 9.0%: CPT | Performed by: INTERNAL MEDICINE

## 2022-06-24 PROCEDURE — 99214 OFFICE O/P EST MOD 30 MIN: CPT | Performed by: INTERNAL MEDICINE

## 2022-06-24 RX ORDER — TERBINAFINE HYDROCHLORIDE 250 MG/1
250 TABLET ORAL DAILY
COMMUNITY
Start: 2022-06-05

## 2022-06-24 RX ORDER — DICLOFENAC SODIUM 10 MG/G
2 GEL TOPICAL
COMMUNITY
End: 2022-10-24 | Stop reason: SDUPTHER

## 2022-06-24 RX ORDER — FLASH GLUCOSE SENSOR
KIT MISCELLANEOUS
Qty: 2 KIT | Refills: 6 | Status: SHIPPED | OUTPATIENT
Start: 2022-06-24

## 2022-06-24 RX ORDER — IRBESARTAN 150 MG/1
150 TABLET ORAL DAILY
Qty: 30 TABLET | Refills: 6 | Status: SHIPPED | OUTPATIENT
Start: 2022-06-24 | End: 2022-07-20 | Stop reason: SDUPTHER

## 2022-06-24 NOTE — LETTER
6/26/2022    Patient: Yulia Castro   YOB: 1985   Date of Visit: 6/24/2022     Janeen Pineda NP  [address]  [address] [facility] [address]  Via Fax: [phone]    Dear Janeen Pineda NP,      Thank you for referring Ms. Stephen Jama to [address] [place] [address] for evaluation. My notes for this consultation are attached. If you have questions, please do not hesitate to call me. I look forward to following your patient along with you.       Sincerely,    Helen Rothman MD

## 2022-06-24 NOTE — PROGRESS NOTES
HISTORY OF PRESENT ILLNESS  Germaien Castle is a 40 y.o. female. HPI  Patient here for  follow up after  Last  visit of Type 2 diabetes mellitus   From   Dec 2021     No meter   Her appetite is not good , nauseous   She stopped nexplanon    She has an ovarian cyst - left side  - painful on and off       Dec 2021   Gained 4 lbs   She  Changed diet significantly   She has not started       Old history      Referred : by self/pcp  H/o diabetes for 15  years   Current A1C is 9.8 %  From oct 2019  and symptoms/problems include polyuria and polydipsia   Current diabetic medications include jardiance , lantus and humalog  And  Metformin 1 gm bid twice a day . Current monitoring regimen: home blood tests - weekly      Review of Systems   Constitutional: Negative. Psychiatric/Behavioral: Negative. Physical Exam   Constitutional: She is oriented to person, place, and time. She appears well-developed and well-nourished. Lab Results   Component Value Date/Time    Hemoglobin A1c 8.1 (H) 11/27/2021 08:38 AM    Hemoglobin A1c 11.3 (H) 08/14/2021 08:31 AM    Hemoglobin A1c 8.6 (H) 08/15/2020 07:41 AM    Hemoglobin A1c, External 9.8 10/01/2019 12:00 AM    Glucose 97 11/27/2021 08:38 AM    Microalb/Creat ratio (ug/mg creat.) 42 (H) 11/27/2021 08:38 AM    LDL, calculated 86 11/27/2021 08:38 AM    LDL, calculated 111 (H) 08/15/2020 07:41 AM    Creatinine 0.56 (L) 11/27/2021 08:38 AM      Lab Results   Component Value Date/Time    Cholesterol, total 161 11/27/2021 08:38 AM    HDL Cholesterol 50 11/27/2021 08:38 AM    LDL, calculated 86 11/27/2021 08:38 AM    LDL, calculated 111 (H) 08/15/2020 07:41 AM    Triglyceride 143 11/27/2021 08:38 AM     Lab Results   Component Value Date/Time    ALT (SGPT) 11 11/27/2021 08:38 AM    Alk.  phosphatase 88 11/27/2021 08:38 AM    Bilirubin, total 0.3 11/27/2021 08:38 AM    Albumin 3.9 11/27/2021 08:38 AM    Protein, total 7.3 11/27/2021 08:38 AM    AFP, Serum, Tumor Marker 2.3 05/16/2022 01:06 PM     Lab Results   Component Value Date/Time    GFR est non- 11/27/2021 08:38 AM    GFR est  11/27/2021 08:38 AM    Creatinine 0.56 (L) 11/27/2021 08:38 AM    BUN 12 11/27/2021 08:38 AM    Sodium 138 11/27/2021 08:38 AM    Potassium 4.2 11/27/2021 08:38 AM    Chloride 103 11/27/2021 08:38 AM    CO2 20 11/27/2021 08:38 AM     ASSESSMENT and PLAN    1. Type 2 DM, poorly controlled : a1c is  8.8 %    From   Today by POC  Compared to     8.1 %    From     Nov 2021    Compared to    11 %   From     August 2021   Compared to    9.8 %      From    May 2021   compared to     8.6 %    From august 2020   Compared to  10. 5  %      From feb 2020    Compared  To     9.8 %   From  Oct 2019         June 2022     Slight  Loss of glycemic control   She has  NOT done better  with diet ( ovarian pain)  No meter    Stay  On  jardiance  And  Metformin  And   victoza at 1.2 mg a day   Stay on basal insulin , lantus   She decided not to [address], but continuing on starlix ( explained her role with diet and taking the pill before meals )    c-pep measurable       Dec 2021     Improved glycemic control   She has done better she agrees with diet   Reviewed the log , sugars are good   Stay  On  jardiance  And  Metformin   Stay  on victoza at 1.2 mg a day   Stay on basal insulin , lantus   She decided not to [address], but continuing on starlix     c-pep measurable       August 2021     Not in control   discrepancy between  a1c   And the LOG     She is checkign 3 times a day   And  For this kind of checks, I would expect a1c be down to 8 %  atleast     Changing starlix to meal time insulin   She  did not  resist at all  For the change, she has to take 4 insulin shots a day   Likely I was preparing her for this   Stay  On  jardiance  And  Metformin   Stay  on victoza at 1.2 mg a day         2. Hypoglycemia :  Educated on treating the hypoglycemia.  Discussed INSULIn use and prescribed    3. HTN : stay  on irbesartan as proteinuria is noticeable     4. Dyslipidemia : LDL is <100  not on meds.      5. use of aspirin to prevent MI and TIA's discussed      F/u in 6 month     Reviewed results with patient and discussed the labs being ordered today/bnv  Patient voiced understanding of plan of care

## 2022-06-24 NOTE — PATIENT INSTRUCTIONS
SPECIFIC INSTRUCTIONS BELOW         DRINK water   Do not skip meals  Do not eat in between meals    Reduce carbs- pasta, rice, potatoes, bread   Try to avoid processed bread products like BISCUITS, CROISSANTS, MUFFINS    Do not drink juices or sodas, even if they are calorie zero or diet drinks and especially avoid using powders like crystalloids , RONDA-AIDS     Do not eat peanut butter     Do not eat sugar free cookies and cakes   Do not eat peaches, oranges, pineapples, raisins, grapes , canteloupe , honey dew, mangoes , watermelon  and fruit medleys      ------------------------------------------------------------------------------------------------------------------------      Stay  On  jardiance  And  Metformin     Stay on  victoza at 1.2 mg a day     Stay     on    LANTUS     60 units a day     Stay on    starlix  120 mg , one pill right before each meal     Less than 70 mg  - no insulin       ------------PAY ATTENTION TO THESE GENERAL INSTRUCTIONS -----------------      - The medications prescribed at this visit will not be available at pharmacy until 6 pm       - YOUR MED LIST IS NOT UP TO DATE AS SOME CHANGES ARE BEING MADE AFTER THE VISIT - FOLLOW SPECIFIC INSTRUCTIONS  ABOVE     -ANY tests other than blood work, which you opt to do  outside the  [facility] imaging facilities, you are responsible for prior authorizations if  required    - 18 Eileen Quintero UP TO DATE ON YOUR AVS- PLEASE IGNORE     Results     *Normal results will not be notified by a phone call starting January 1 2021   *If you have an upcoming visit, the results will be discussed at the visit   *Please sign up for MY CHART if you want access to your lab and test results  *Abnormal results which require immediate attention will be notified by phone call   *Abnormal results which do not require immediate assistance will be notified in 1-2 weeks       Refills    -    have your pharmacy send us a refill request . Refills are done max for one year and a visit is a must before refills are extended    Follow up appointments -  highly encourage you to make it when you are checking out. We can accommodate you into the schedule based on your clinical situation, but not for extending refills beyond a year. Labs are important to give refills and is important to get labs before the visit     Phone calls  -  Allow  24 hrs.  for non-urgent calls to be returned  Prior authorization - It may take 2-4 weeks to process  Forms  -  FMLA, DMV etc., will take up to 2 weeks to process  Cancellations - please notify the office 2 days in advance   Samples  - will only be dispensed at visits       If not showing for the appointments and cancelling appointments within 24 hours are kept track of and three  of such situations in  two consecutive years will likely be considered for termination from the practice    -------------------------------------------------------------------------------------------------------------------

## 2022-06-29 DIAGNOSIS — E11.65 UNCONTROLLED TYPE 2 DIABETES MELLITUS WITH HYPERGLYCEMIA (HCC): Primary | ICD-10-CM

## 2022-06-29 NOTE — TELEPHONE ENCOUNTER
Patient needs a script for martha called to her pharmacy ([facility]) because her insurance will not cover lantis any longer.  Thank you

## 2022-07-20 ENCOUNTER — TELEPHONE (OUTPATIENT)
Dept: ENDOCRINOLOGY | Age: 37
End: 2022-07-20

## 2022-07-20 RX ORDER — METFORMIN HYDROCHLORIDE 1000 MG/1
1000 TABLET ORAL 2 TIMES DAILY WITH MEALS
Qty: 180 TABLET | Refills: 3 | Status: SHIPPED | OUTPATIENT
Start: 2022-07-20

## 2022-07-20 RX ORDER — IRBESARTAN 150 MG/1
150 TABLET ORAL DAILY
Qty: 90 TABLET | Refills: 3 | Status: SHIPPED | OUTPATIENT
Start: 2022-07-20

## 2022-07-20 NOTE — TELEPHONE ENCOUNTER
Pt states the pharmacy needs new scripts for metformin and irbesartan. Please advise if need more info.  Rosetta Guzman

## 2022-07-20 NOTE — TELEPHONE ENCOUNTER
Patient left message on office voicemail requesting to speak with nurse about her medication.   [phone]

## 2022-09-27 ENCOUNTER — TELEPHONE (OUTPATIENT)
Dept: ENDOCRINOLOGY | Age: 37
End: 2022-09-27

## 2022-09-27 DIAGNOSIS — E11.65 UNCONTROLLED TYPE 2 DIABETES MELLITUS WITH HYPERGLYCEMIA (HCC): ICD-10-CM

## 2022-09-27 NOTE — TELEPHONE ENCOUNTER
Santino Quiroz . [facility] needs new RX. Cartilage Graft Text: The defect edges were debeveled with a #15 scalpel blade.  Given the location of the defect, shape of the defect, the fact the defect involved a full thickness cartilage defect a cartilage graft was deemed most appropriate.  An appropriate donor site was identified, cleansed, and anesthetized. The cartilage graft was then harvested and transferred to the recipient site, oriented appropriately and then sutured into place.  The secondary defect was then repaired using a primary closure.

## 2022-10-24 ENCOUNTER — OFFICE VISIT (OUTPATIENT)
Dept: ENDOCRINOLOGY | Age: 37
End: 2022-10-24
Payer: COMMERCIAL

## 2022-10-24 VITALS
OXYGEN SATURATION: 97 % | HEART RATE: 98 BPM | HEIGHT: 65 IN | TEMPERATURE: 98.1 F | SYSTOLIC BLOOD PRESSURE: 142 MMHG | RESPIRATION RATE: 18 BRPM | WEIGHT: 226 LBS | BODY MASS INDEX: 37.65 KG/M2 | DIASTOLIC BLOOD PRESSURE: 82 MMHG

## 2022-10-24 DIAGNOSIS — E11.65 UNCONTROLLED TYPE 2 DIABETES MELLITUS WITH HYPERGLYCEMIA (HCC): Primary | ICD-10-CM

## 2022-10-24 DIAGNOSIS — R80.1 PERSISTENT PROTEINURIA: ICD-10-CM

## 2022-10-24 DIAGNOSIS — I10 ESSENTIAL HYPERTENSION: ICD-10-CM

## 2022-10-24 DIAGNOSIS — E78.2 MIXED HYPERLIPIDEMIA: ICD-10-CM

## 2022-10-24 PROCEDURE — 99214 OFFICE O/P EST MOD 30 MIN: CPT | Performed by: INTERNAL MEDICINE

## 2022-10-24 PROCEDURE — 3052F HG A1C>EQUAL 8.0%<EQUAL 9.0%: CPT | Performed by: INTERNAL MEDICINE

## 2022-10-24 RX ORDER — DICLOFENAC SODIUM 10 MG/G
2 GEL TOPICAL
Qty: 100 G | Refills: 5 | Status: SHIPPED | OUTPATIENT
Start: 2022-10-24

## 2022-10-24 RX ORDER — PEN NEEDLE, DIABETIC 31 GX3/16"
NEEDLE, DISPOSABLE MISCELLANEOUS
Qty: 400 PEN NEEDLE | Refills: 3 | Status: SHIPPED | OUTPATIENT
Start: 2022-10-24

## 2022-10-24 NOTE — PATIENT INSTRUCTIONS
SPECIFIC INSTRUCTIONS BELOW         DRINK water   Do not skip meals  Do not eat in between meals    Reduce carbs- pasta, rice, potatoes, bread   Try to avoid processed bread products like BISCUITS, CROISSANTS, MUFFINS    Do not drink juices or sodas, even if they are calorie zero or diet drinks and especially avoid using powders like crystalloids , RONDA-AIDS     Do not eat peanut butter     Do not eat sugar free cookies and cakes   Do not eat peaches, oranges, pineapples, raisins, grapes , canteloupe , honey dew, mangoes , watermelon  and fruit medleys      ------------------------------------------------------------------------------------------------------------------------      Stay  On  jardiance  And  Metformin     Stay on  victoza at 1.2 mg a day     Stay     on    LANTUS     60 units a day     Stay on    starlix  120 mg , one pill right before each meal     Less than 70 mg  - no insulin             -------------PAY ATTENTION TO THESE GENERAL INSTRUCTIONS -----------------      - The medications prescribed at this visit will not be available at pharmacy until 6 pm       - YOUR MED LIST IS NOT UP TO DATE AS SOME CHANGES ARE BEING MADE AFTER THE VISIT - FOLLOW SPECIFIC INSTRUCTIONS  ABOVE     -ANY tests other than blood work, which you opt to do  outside the  [facility] facilities, you are responsible for prior authorizations if  required    - [address] [facility]- PLEASE IGNORE     Results     *Normal results will not be notified by a phone call starting January 1 2021   *If you have an upcoming visit, the results will be discussed at the visit   *Please sign up for MY CHART if you want access to your lab and test results  *Abnormal results which require immediate attention will be notified by phone call   *Abnormal results which do not require immediate assistance will be notified in 1-2 weeks       Refills    -    have your pharmacy send us a refill request . Refills are done max for one year and a visit is a must before refills are extended    Follow up appointments -  highly encourage you to make it when you are checking out. We can accommodate you into the schedule based on your clinical situation, but not for extending refills beyond a year. Labs are important to give refills and is important to get labs before the visit     Phone calls  -  Allow  24 hrs.  for non-urgent calls to be returned  Prior authorization - It may take 2-4 weeks to process  Forms  -  FMLA, DMV etc., will take up to 2 weeks to process  Cancellations - please notify the office 2 days in advance   Samples  - will only be dispensed at visits       If not showing for the appointments and cancelling appointments within 24 hours are kept track of and three  of such situations in  two consecutive years will likely be considered for termination from the practice    -------------------------------------------------------------------------------------------------------------------

## 2022-10-24 NOTE — PROGRESS NOTES
HISTORY OF PRESENT ILLNESS  Vivi Miner is a 40 y.o. female. HPI  Patient here for  follow up after  Last  visit of Type 2 diabetes mellitus   From   June 2022       She got  the meter   She let go her job as she was under constant  stress . She still has  old insurance plan        June 2022      No meter   Her appetite is not good , nauseous   She stopped nexplanon  She has an ovarian cyst - left side  - painful on and off       Dec 2021   Gained 4 lbs   She  Changed diet significantly   She has not started       Old history      Referred : by self/pcp  H/o diabetes for 15  years   Current A1C is 9.8 %  From oct 2019  and symptoms/problems include polyuria and polydipsia   Current diabetic medications include jardiance , lantus and humalog  And  Metformin 1 gm bid twice a day . Current monitoring regimen: home blood tests - weekly      Review of Systems   Constitutional: Negative. Psychiatric/Behavioral: Negative. Physical Exam   Constitutional: She is oriented to person, place, and time. She appears well-developed and well-nourished. Lab Results   Component Value Date/Time    Hemoglobin A1c 8.1 (H) 11/27/2021 08:38 AM    Hemoglobin A1c 11.3 (H) 08/14/2021 08:31 AM    Hemoglobin A1c 8.6 (H) 08/15/2020 07:41 AM    Hemoglobin A1c, External 9.8 10/01/2019 12:00 AM    Glucose 97 11/27/2021 08:38 AM    Microalb/Creat ratio (ug/mg creat.) 42 (H) 11/27/2021 08:38 AM    LDL, calculated 86 11/27/2021 08:38 AM    LDL, calculated 111 (H) 08/15/2020 07:41 AM    Creatinine 0.56 (L) 11/27/2021 08:38 AM      Lab Results   Component Value Date/Time    Cholesterol, total 161 11/27/2021 08:38 AM    HDL Cholesterol 50 11/27/2021 08:38 AM    LDL, calculated 86 11/27/2021 08:38 AM    LDL, calculated 111 (H) 08/15/2020 07:41 AM    Triglyceride 143 11/27/2021 08:38 AM     Lab Results   Component Value Date/Time    ALT (SGPT) 11 11/27/2021 08:38 AM    Alk.  phosphatase 88 11/27/2021 08:38 AM    Bilirubin, total 0.3 11/27/2021 08:38 AM    Albumin 3.9 11/27/2021 08:38 AM    Protein, total 7.3 11/27/2021 08:38 AM    AFP, Serum, Tumor Marker 2.3 05/16/2022 01:06 PM     Lab Results   Component Value Date/Time    GFR est non- 11/27/2021 08:38 AM    GFR est  11/27/2021 08:38 AM    Creatinine 0.56 (L) 11/27/2021 08:38 AM    BUN 12 11/27/2021 08:38 AM    Sodium 138 11/27/2021 08:38 AM    Potassium 4.2 11/27/2021 08:38 AM    Chloride 103 11/27/2021 08:38 AM    CO2 20 11/27/2021 08:38 AM     ASSESSMENT and PLAN    1. Type 2 DM, poorly controlled : a1c is    11.5 %    from  nov 2022   compared to   8.8 %    From   Today by POC  Compared to     8.1 %    From     Nov 2021    Compared to    11 %   From     August 2021   Compared to    9.8 %      From    May 2021   compared to     8.6 %    From august 2020   Compared to  10. 5  %      From feb 2020    Compared  To     9.8 %   From  Oct 2019         October 2022     Further   Loss of glycemic control by A1c     Her sept and oct logs are good - discrepant of  a1c , she says her July and august numbers were worse     No meter  to review   Stay  On  jardiance  And  Metformin  And   victoza at 1.2 mg a day   Stay on basal insulin , lantus only   She decided not to [address], but continuing on starlix ( explained her role with diet and taking the pill before meals )      Again , I alerted her about the limitations on starlix and she will CALL me if sugars stay up   c-pep measurable             June 2022     Slight  Loss of glycemic control   She has  NOT done better  with diet ( ovarian pain)  No meter    Stay  On  jardiance  And  Metformin  And   victoza at 1.2 mg a day   Stay on basal insulin , lantus   She decided not to [address], but continuing on starlix ( explained her role with diet and taking the pill before meals )    c-pep measurable         2. Hypoglycemia :  Educated on treating the hypoglycemia. Discussed INSULIn use and prescribed    3. HTN : stay  on irbesartan as proteinuria is noticeable     4. Dyslipidemia : LDL is <100  not on meds.      5. Neuropathy - star on voltaren gel       F/u in 6 month     Reviewed results with patient and discussed the labs being ordered today/bnv  Patient voiced understanding of plan of care

## 2022-10-24 NOTE — LETTER
10/25/2022    Patient: Lalo Aly   YOB: 1985   Date of Visit: 10/24/2022     Early Ganser, NP  [address]  [address] [facility] [address]  Via Fax: [phone]    Dear Early Ganser, NP,      Thank you for referring Ms. Bree Dooley to [id number] [place] [address] for evaluation. My notes for this consultation are attached. If you have questions, please do not hesitate to call me. I look forward to following your patient along with you.       Sincerely,    Jay Agustin MD

## 2022-10-24 NOTE — PROGRESS NOTES
Randell Rowe is a 40 y.o. female here for   Chief Complaint   Patient presents with    Diabetes       1. Have you been to the ER, urgent care clinic since your last visit? Hospitalized since your last visit? -no    2. Have you seen or consulted any other health care providers outside of the [address] [place] [address] since your last visit?   Include any pap smears or colon screening.-no

## 2023-02-03 ENCOUNTER — TELEPHONE (OUTPATIENT)
Dept: ENDOCRINOLOGY | Age: 38
End: 2023-02-03

## 2023-02-03 DIAGNOSIS — E11.65 UNCONTROLLED TYPE 2 DIABETES MELLITUS WITH HYPERGLYCEMIA (HCC): ICD-10-CM

## 2023-02-03 NOTE — TELEPHONE ENCOUNTER
Pt called and needs a rewrite for RX for her PCP, Medication Tougeo and Starlixx (?). Please call her for the correct medications.

## 2023-02-06 RX ORDER — NATEGLINIDE 120 MG/1
120 TABLET ORAL
Qty: 270 TABLET | Refills: 5 | Status: SHIPPED | OUTPATIENT
Start: 2023-02-06

## 2023-03-01 ENCOUNTER — OFFICE VISIT (OUTPATIENT)
Dept: ENDOCRINOLOGY | Age: 38
End: 2023-03-01
Payer: COMMERCIAL

## 2023-03-01 VITALS
SYSTOLIC BLOOD PRESSURE: 166 MMHG | HEIGHT: 65 IN | BODY MASS INDEX: 36.29 KG/M2 | OXYGEN SATURATION: 99 % | WEIGHT: 217.8 LBS | HEART RATE: 111 BPM | TEMPERATURE: 97.9 F | DIASTOLIC BLOOD PRESSURE: 86 MMHG

## 2023-03-01 DIAGNOSIS — E11.65 UNCONTROLLED TYPE 2 DIABETES MELLITUS WITH HYPERGLYCEMIA (HCC): Primary | ICD-10-CM

## 2023-03-01 DIAGNOSIS — R80.1 PERSISTENT PROTEINURIA: ICD-10-CM

## 2023-03-01 DIAGNOSIS — E78.2 MIXED HYPERLIPIDEMIA: ICD-10-CM

## 2023-03-01 DIAGNOSIS — I10 ESSENTIAL HYPERTENSION: ICD-10-CM

## 2023-03-01 PROCEDURE — 99215 OFFICE O/P EST HI 40 MIN: CPT | Performed by: INTERNAL MEDICINE

## 2023-03-01 PROCEDURE — 3079F DIAST BP 80-89 MM HG: CPT | Performed by: INTERNAL MEDICINE

## 2023-03-01 PROCEDURE — 3046F HEMOGLOBIN A1C LEVEL >9.0%: CPT | Performed by: INTERNAL MEDICINE

## 2023-03-01 PROCEDURE — 3077F SYST BP >= 140 MM HG: CPT | Performed by: INTERNAL MEDICINE

## 2023-03-01 RX ORDER — TIRZEPATIDE 5 MG/.5ML
5 INJECTION, SOLUTION SUBCUTANEOUS
Qty: 4 PEN | Refills: 3 | Status: SHIPPED | OUTPATIENT
Start: 2023-03-01

## 2023-03-01 RX ORDER — FLASH GLUCOSE SENSOR
KIT MISCELLANEOUS
Qty: 2 KIT | Refills: 5 | Status: SHIPPED | OUTPATIENT
Start: 2023-03-01

## 2023-03-01 RX ORDER — AMOXICILLIN 875 MG/1
TABLET, FILM COATED ORAL
COMMUNITY
Start: 2023-02-25

## 2023-03-01 NOTE — PROGRESS NOTES
Jacoby Hester MD [address]     HISTORY OF PRESENT ILLNESS  Víctor Beltran is a 45 y.o. female. HPI  Patient here for  follow up after  Last  visit of Type 2 diabetes mellitus   From   October 2022     Continues to be in stress from health related issues   Lost 9 lbs     She is not very interactive today   No meter in hand   Wants sensors,   Not changed her diet much       October 2022     She got  the meter   She let go her job as she was under constant  stress . She still has  old insurance plan        June 2022      No meter   Her appetite is not good , nauseous   She stopped nexplanon  She has an ovarian cyst - left side  - painful on and off       Dec 2021   Gained 4 lbs   She  Changed diet significantly   She has not started       Old history      Referred : by self/pcp  H/o diabetes for 15  years   Current A1C is 9.8 %  From oct 2019  and symptoms/problems include polyuria and polydipsia   Current diabetic medications include jardiance , lantus and humalog  And  Metformin 1 gm bid twice a day . Current monitoring regimen: home blood tests - weekly      Review of Systems   Constitutional: Negative. Psychiatric/Behavioral: Negative. Physical Exam   Constitutional: She is oriented to person, place, and time. She appears well-developed and well-nourished.          Lab Results   Component Value Date/Time    Hemoglobin A1c 10.3 (H) 02/25/2023 08:15 AM    Hemoglobin A1c 8.1 (H) 11/27/2021 08:38 AM    Hemoglobin A1c 11.3 (H) 08/14/2021 08:31 AM    Hemoglobin A1c, External 9.8 10/01/2019 12:00 AM    Glucose 186 (H) 02/25/2023 08:15 AM    Microalb/Creat ratio (ug/mg creat.) 180 (H) 02/25/2023 08:15 AM    LDL, calculated 106 (H) 02/25/2023 08:15 AM    LDL, calculated 111 (H) 08/15/2020 07:41 AM    Creatinine 0.79 02/25/2023 08:15 AM      Lab Results   Component Value Date/Time    Cholesterol, total 187 02/25/2023 08:15 AM    HDL Cholesterol 40 02/25/2023 08:15 AM    LDL, calculated 106 (H) 02/25/2023 08:15 AM    LDL, calculated 111 (H) 08/15/2020 07:41 AM    Triglyceride 241 (H) 02/25/2023 08:15 AM     Lab Results   Component Value Date/Time    ALT (SGPT) 13 02/25/2023 08:15 AM    Alk. phosphatase 98 02/25/2023 08:15 AM    Bilirubin, total 0.2 02/25/2023 08:15 AM    Albumin 4.2 02/25/2023 08:15 AM    Protein, total 7.5 02/25/2023 08:15 AM    AFP, Serum, Tumor Marker 2.3 05/16/2022 01:06 PM     Lab Results   Component Value Date/Time    GFR est non- 11/27/2021 08:38 AM    GFR est  11/27/2021 08:38 AM    Creatinine 0.79 02/25/2023 08:15 AM    BUN 15 02/25/2023 08:15 AM    Sodium 138 02/25/2023 08:15 AM    Potassium 4.4 02/25/2023 08:15 AM    Chloride 102 02/25/2023 08:15 AM    CO2 21 02/25/2023 08:15 AM     ASSESSMENT and PLAN    1.   Type 2 DM, poorly controlled : a1c is   10.3  %   from  feb 2023   compared  to    11.5 %    from  nov 2022   compared to   8.8 %    From   Today by POC  Compared to     8.1 %    From     Nov 2021    Compared to    11 %   From     August 2021   Compared to    9.8 %      From    May 2021   compared to     8.6 %    From august 2020   Compared to  10. 5  %      From feb 2020    Compared  To     9.8 %   From  Oct 2019       March 2023   Almost 20 years  of diabetes , starting to show urine protein      Stay  On  jardiance  And  Metformin  And   victoza at 1.2 mg a day   Stay on basal insulin , stopping STARLIX   Will start on meal time insulin -   Went over the sliding scale regimen at length   Will do Mounjaro as it is easy to use , once weekly         October 2022     Further   Loss of glycemic control by A1c   Her sept and oct logs are good - discrepant of  a1c , she says her July and august numbers were worse   No meter  to review   Stay  On  jardiance  And  Metformin  And   victoza at 1.2 mg a day   Stay on basal insulin , lantus only   She decided not to [address], but continuing on starlix ( explained her role with diet and taking the pill before meals )      Again , I alerted her about the limitations on starlix and she will CALL me if sugars stay up   c-pep measurable         2. Hypoglycemia :  Educated on treating the hypoglycemia. Discussed INSULIn use and prescribed    3. HTN : stay  on irbesartan as proteinuria is noticeable     4. Dyslipidemia : LDL is <100  not on meds.        F/u in 2  month     Reviewed results with patient and discussed the labs being ordered today/bnv  Patient voiced understanding of plan of care

## 2023-03-01 NOTE — PATIENT INSTRUCTIONS
SPECIFIC INSTRUCTIONS BELOW       DRINK water   Do not skip meals  Do not eat in between meals    Reduce carbs- pasta, rice, potatoes, bread   Try to avoid processed bread products like BISCUITS, CROISSANTS, MUFFINS    Do not drink juices or sodas, even if they are calorie zero or diet drinks and especially avoid using powders like crystalloids , RONDA-AIDS     Do not eat peanut butter     Do not eat sugar free cookies and cakes   Do not eat peaches, oranges, pineapples, raisins, grapes , canteloupe , honey dew, mangoes , watermelon  and fruit medleys      -------------------------------------------------------------------------------------------------------------------    Stay  On  jardiance  And  Metformin     Stay on  victoza at 1.2 mg a day  ( change to Mounjaro   to 5  mg once a week )     Stay     on    levemir    60 units a day     Stop   starlix  120 mg    Check blood sugars immediately before each meal and at bedtime      Take lyumjev  insulin 8 units before meals      Also, add additional lyumjev as follows with meals  If blood sugars are[de-identified]    150-200 mg 2 units    201-250 mg 5 units    251-300 mg 8 units    301-350 mg 11 units    351-400 mg 14 units    401-450 mg 17 units    451-500 mg 20 units     Less than 70 mg NO INSULIN                    -------------PAY ATTENTION TO THESE GENERAL INSTRUCTIONS -----------------      - The medications prescribed at this visit will not be available at pharmacy until 6 pm       - YOUR MED LIST IS NOT UP TO DATE AS SOME CHANGES ARE BEING MADE AFTER THE VISIT - FOLLOW SPECIFIC INSTRUCTIONS  ABOVE     -ANY tests other than blood work, which you opt to do  outside the  [facility] facilities, you are responsible for prior authorizations if  required    - [address] AVS- PLEASE IGNORE     Results     *Normal results will not be notified by a phone call starting January 1 2021   *If you have an upcoming visit, the results will be discussed at the visit   *Please sign up for MY CHART if you want access to your lab and test results  *Abnormal results which require immediate attention will be notified by phone call   *Abnormal results which do not require immediate assistance will be notified in 1-2 weeks       Refills    -    have your pharmacy send us a refill request . Refills are done max for one year and a visit is a must before refills are extended    Follow up appointments -  highly encourage you to make it when you are checking out. We can accommodate you into the schedule based on your clinical situation, but not for extending refills beyond a year. Labs are important to give refills and is important to get labs before the visit     Phone calls  -  Allow  24 hrs.  for non-urgent calls to be returned  Prior authorization - It may take 2-4 weeks to process  Forms  -  FMLA, DMV etc., will take up to 2 weeks to process  Cancellations - please notify the office 2 days in advance   Samples  - will only be dispensed at visits       If not showing for the appointments and cancelling appointments within 24 hours are kept track of and three  of such situations in  two consecutive years will likely be considered for termination from the practice    -------------------------------------------------------------------------------------------------------------------

## 2023-03-01 NOTE — LETTER
3/1/2023    Patient: Andreia Gonzalez   YOB: 1985   Date of Visit: 3/1/2023     Roberta Ruelas NP  [address]  [address] [facility] [address]  Via Fax: [phone]    Dear Roberta Ruelas NP,      Thank you for referring Ms. Nata Cooney to [address] [place] [address] for evaluation. My notes for this consultation are attached. If you have questions, please do not hesitate to call me. I look forward to following your patient along with you.       Sincerely,    Chitra Evangelista MD

## 2023-03-01 NOTE — PROGRESS NOTES
Bran Rollins is a 45 y.o. female here for   Chief Complaint   Patient presents with    Diabetes       1. Have you been to the ER, urgent care clinic since your last visit? Hospitalized since your last visit? - [facility]     2. Have you seen or consulted any other health care providers outside of the [address] [place] [address] since your last visit?   Include any pap smears or colon screening.- No

## 2023-03-02 DIAGNOSIS — E11.65 UNCONTROLLED TYPE 2 DIABETES MELLITUS WITH HYPERGLYCEMIA (HCC): ICD-10-CM

## 2023-03-02 DIAGNOSIS — E11.65 UNCONTROLLED TYPE 2 DIABETES MELLITUS WITH HYPERGLYCEMIA (HCC): Primary | ICD-10-CM

## 2023-03-02 RX ORDER — INSULIN LISPRO-AABC 100 [IU]/ML
INJECTION, SOLUTION INTRAVENOUS; SUBCUTANEOUS
Qty: 3 EACH | Refills: 5 | Status: SHIPPED | OUTPATIENT
Start: 2023-03-02

## 2023-03-02 RX ORDER — INSULIN PUMP CONTROLLER
EACH MISCELLANEOUS
Qty: 2 BOX | Refills: 3 | Status: SHIPPED | OUTPATIENT
Start: 2023-03-02

## 2023-03-02 RX ORDER — INSULIN LISPRO-AABC 100 [IU]/ML
INJECTION, SOLUTION SUBCUTANEOUS
Qty: 2 EACH | Refills: 0 | Status: SHIPPED | COMMUNITY
Start: 2023-03-02

## 2023-03-02 RX ORDER — INSULIN LISPRO-AABC 100 [IU]/ML
INJECTION, SOLUTION SUBCUTANEOUS
Qty: 75 EACH | Refills: 3 | Status: SHIPPED | OUTPATIENT
Start: 2023-03-02

## 2023-03-02 RX ORDER — PEN NEEDLE, DIABETIC 31 GX3/16"
NEEDLE, DISPOSABLE MISCELLANEOUS
Qty: 400 PEN NEEDLE | Refills: 3 | Status: SHIPPED | OUTPATIENT
Start: 2023-03-02

## 2023-03-08 DIAGNOSIS — E11.65 UNCONTROLLED TYPE 2 DIABETES MELLITUS WITH HYPERGLYCEMIA (HCC): Primary | ICD-10-CM

## 2023-03-08 RX ORDER — TIRZEPATIDE 5 MG/.5ML
5 INJECTION, SOLUTION SUBCUTANEOUS
Qty: 4 PEN | Refills: 3 | Status: SHIPPED | OUTPATIENT
Start: 2023-03-08 | End: 2023-03-10

## 2023-03-08 NOTE — TELEPHONE ENCOUNTER
PA request received from patient's pharmacy citing a quantity/day supply that exceeds plan limit. It is shown on request as 4 pens for 56 day supply. Rx pended with note.

## 2023-03-10 ENCOUNTER — OFFICE VISIT (OUTPATIENT)
Dept: OBGYN CLINIC | Age: 38
End: 2023-03-10
Payer: COMMERCIAL

## 2023-03-10 ENCOUNTER — PATIENT MESSAGE (OUTPATIENT)
Dept: ENDOCRINOLOGY | Age: 38
End: 2023-03-10

## 2023-03-10 VITALS
SYSTOLIC BLOOD PRESSURE: 135 MMHG | BODY MASS INDEX: 36.82 KG/M2 | DIASTOLIC BLOOD PRESSURE: 74 MMHG | HEART RATE: 108 BPM | OXYGEN SATURATION: 96 % | RESPIRATION RATE: 16 BRPM | HEIGHT: 65 IN | WEIGHT: 221 LBS

## 2023-03-10 DIAGNOSIS — Z87.42 HISTORY OF OVARIAN CYST: ICD-10-CM

## 2023-03-10 DIAGNOSIS — R10.2 PELVIC PAIN IN FEMALE: Primary | ICD-10-CM

## 2023-03-10 PROCEDURE — 99213 OFFICE O/P EST LOW 20 MIN: CPT | Performed by: OBSTETRICS & GYNECOLOGY

## 2023-03-10 RX ORDER — ACETAMINOPHEN AND CODEINE PHOSPHATE 120; 12 MG/5ML; MG/5ML
1 SOLUTION ORAL DAILY
Qty: 3 DOSE PACK | Refills: 3 | Status: SHIPPED | OUTPATIENT
Start: 2023-03-10

## 2023-03-10 RX ORDER — NATEGLINIDE 60 MG/1
60 TABLET ORAL
COMMUNITY

## 2023-03-31 NOTE — PROGRESS NOTES
Angelita Akhtar is a 45 y.o. female, , Patient's last menstrual period was 2023., who presents today for the following:  Chief Complaint   Patient presents with    Pelvic Pain     Pt c/o pain on R side from fibroids. No Known Allergies    Current Outpatient Medications   Medication Sig    nateglinide (Starlix) 60 mg tablet Take 60 mg by mouth Before breakfast, lunch, and dinner.  norethindrone (MICRONOR) 0.35 mg tab Take 1 Tablet by mouth daily.  insulin detemir U-100 (LEVEMIR FLEXTOUCH) 100 unit/mL (3 mL) inpn Inject 60 units at bedtime    insulin lispro-aabc (Lyumjev KwikPen U-100 Insulin) 100 unit/mL inpn Inject 8 units subcutaneously before each meal plus sliding scale for a max daily dose of 84 units    Insulin Needles, Disposable, (Ginny Pen Needle) 32 gauge x 5/32\" ndle Use to inject insulin four times daily. Dx. Code E11.65    insulin lispro-aabc (Lyumjev U-100 Insulin) 100 unit/mL soln Requires  max 100 units a day in insulin pump    flash glucose sensor (FreeStyle Alanna 2 Sensor) kit To use it every 14 days    diclofenac (VOLTAREN) 1 % gel Apply 2 g to affected area four (4) times daily as needed for Pain. for feet pan    empagliflozin (Jardiance) 25 mg tablet Take 1 Tablet by mouth daily.  metFORMIN (GLUCOPHAGE) 1,000 mg tablet Take 1 Tablet by mouth two (2) times daily (with meals).  irbesartan (AVAPRO) 150 mg tablet Take 1 Tablet by mouth in the morning.  terbinafine HCL (LAMISIL) 250 mg tablet Take 250 mg by mouth daily.  norethindrone (MICRONOR) 0.35 mg tab Take 1 Tablet by mouth. No current facility-administered medications for this visit.        Past Medical History:   Diagnosis Date    [facility]     History of MRI 2021    negative breast MRI    [facility]varian cyst 2021    [facility]    Proliferative diabetic retinopathy [facility]     Retinal detachment 2017    left eye       Past Surgical History: Procedure Laterality Date    HX ANKLE FRACTURE [place]  2011    HX  SECTION      HX RETINAL DETACHMENT REPAIR  2017    HX TONSILLECTOMY         Family History   Problem Relation Age of Onset    Hypertension Mother     Diabetes Father     Cancer Sister         breast cancer    Breast Cancer Sister     No Known Problems Daughter     Breast Cancer Sister        Social History     Socioeconomic History    Marital status:      Spouse name: Not on file    Number of children: Not on file    Years of education: Not on file    Highest education level: Not on file   Occupational History    Not on file   Tobacco Use    Smoking status: Never    Smokeless tobacco: Never   Substance and Sexual Activity    Alcohol use: Never    Drug use: Never    Sexual activity: Yes     Partners: Male     Birth control/protection: Pill   Other Topics Concern    Not on file   Social History Narrative    Not on file     Social Determinants of Health     Financial Resource Strain: Not on file   Food Insecurity: Not on file   Transportation Needs: Not on file   Physical Activity: Not on file   Stress: Not on file   Social Connections: Not on file   Intimate Partner Violence: Not on file   Housing Stability: Not on file         Pelvic Pain  Associated symptoms include abdominal pain. HPI  Reported by patient. Location: abdominal /pelvic  Onset/Timing: > 1 month  Duration: intermittent in nature  Quality: aching; cramping; pressure;   Severity: interferes with normal activities   Alleviating Factors:  rest  Aggravating Factors: menstrual cycles  Associated Symptoms: no back pain; no chills; no constipation; no diarrhea; no vaginal discharge; no pain with urination; normal emptying of bladder; no feelings of urgency; no blood in the urine; normal libido; no fever; no nausea; no vomiting; no nocturia;    History of uterine fibroids and ovarian cysts  Requesting refill on ocp    Review of Systems Constitutional: Negative. Respiratory: Negative. Cardiovascular: Negative. Gastrointestinal:  Positive for abdominal pain. Genitourinary: Negative. Musculoskeletal: Negative. Skin: Negative. Neurological: Negative. Endo/Heme/Allergies: Negative. Psychiatric/Behavioral: Negative. All other systems reviewed and are negative. /74 (BP 1 Location: Right upper arm, BP Patient Position: Sitting)   Pulse (!) 108   Resp 16   Ht 5' 5\" (1.651 m)   Wt 221 lb (100.2 kg)   LMP 02/27/2023   SpO2 96%   BMI 36.78 kg/m²    OBGyn Exam   PE:  Constitutional: General Appearance: healthy-appearing, well-nourished, well-developed, and well groomed. Psychiatric: Orientation: to time, place, and person. Mood and Affect: normal mood and affect and appropriate and active and alert. Abdomen: Auscultation/Inspection/Palpation: no tenderness and mass and non-distended. Hernia: none palpated. Female Genitalia: Vulva: no masses, atrophy, or lesions. Bladder/Urethra: no urethral discharge or mass and normal meatus and bladder non distended. Vagina no tenderness, erythema, cystocele, rectocele, abnormal vaginal discharge, or vesicle(s) or ulcers. Cervix: no discharge or cervical motion tenderness and grossly normal.     Uterus: mobile, non-tender, and no uterine prolapse. Adnexa/Parametria: no adnexal tenderness. 1. Pelvic pain in female    - [address]; Future    2. History of ovarian cyst    - US TRANSVAGINAL; Future  - norethindrone (MICRONOR) 0.35 mg tab; Take 1 Tablet by mouth daily. Dispense: 3 Dose Pack;  Refill: 3

## 2023-04-24 LAB
ALBUMIN SERPL-MCNC: 4 G/DL (ref 3.8–4.8)
ALBUMIN/GLOB SERPL: 1.3 {RATIO} (ref 1.2–2.2)
ALP SERPL-CCNC: 87 IU/L (ref 44–121)
ALT SERPL-CCNC: 12 IU/L (ref 0–32)
AST SERPL-CCNC: 9 IU/L (ref 0–40)
BILIRUB SERPL-MCNC: 0.3 MG/DL (ref 0–1.2)
BUN SERPL-MCNC: 17 MG/DL (ref 6–20)
BUN/CREAT SERPL: 25 (ref 9–23)
C PEPTIDE SERPL-MCNC: 2 NG/ML (ref 1.1–4.4)
CALCIUM SERPL-MCNC: 9.5 MG/DL (ref 8.7–10.2)
CHLORIDE SERPL-SCNC: 104 MMOL/L (ref 96–106)
CO2 SERPL-SCNC: 23 MMOL/L (ref 20–29)
CREAT SERPL-MCNC: 0.67 MG/DL (ref 0.57–1)
EGFRCR SERPLBLD CKD-EPI 2021: 115 ML/MIN/1.73
EST. AVERAGE GLUCOSE BLD GHB EST-MCNC: 203 MG/DL
GLOBULIN SER CALC-MCNC: 3.2 G/DL (ref 1.5–4.5)
GLUCOSE SERPL-MCNC: 144 MG/DL (ref 70–99)
HBA1C MFR BLD: 8.7 % (ref 4.8–5.6)
POTASSIUM SERPL-SCNC: 4.5 MMOL/L (ref 3.5–5.2)
PROT SERPL-MCNC: 7.2 G/DL (ref 6–8.5)
SODIUM SERPL-SCNC: 140 MMOL/L (ref 134–144)

## 2023-05-03 ENCOUNTER — OFFICE VISIT (OUTPATIENT)
Dept: ENDOCRINOLOGY | Age: 38
End: 2023-05-03

## 2023-05-03 VITALS
TEMPERATURE: 97.5 F | WEIGHT: 225 LBS | SYSTOLIC BLOOD PRESSURE: 123 MMHG | RESPIRATION RATE: 18 BRPM | HEIGHT: 65 IN | BODY MASS INDEX: 37.49 KG/M2 | DIASTOLIC BLOOD PRESSURE: 78 MMHG | OXYGEN SATURATION: 98 % | HEART RATE: 107 BPM

## 2023-05-03 DIAGNOSIS — I10 ESSENTIAL HYPERTENSION: ICD-10-CM

## 2023-05-03 DIAGNOSIS — E11.65 UNCONTROLLED TYPE 2 DIABETES MELLITUS WITH HYPERGLYCEMIA (HCC): Primary | ICD-10-CM

## 2023-05-03 DIAGNOSIS — E78.2 MIXED HYPERLIPIDEMIA: ICD-10-CM

## 2023-05-03 DIAGNOSIS — R80.1 PERSISTENT PROTEINURIA: ICD-10-CM

## 2023-05-03 RX ORDER — TIRZEPATIDE 5 MG/.5ML
5 INJECTION, SOLUTION SUBCUTANEOUS
Qty: 4 PEN | Refills: 11 | Status: SHIPPED | OUTPATIENT
Start: 2023-05-03

## 2023-05-03 RX ORDER — INSULIN LISPRO-AABC 100 [IU]/ML
INJECTION, SOLUTION INTRAVENOUS; SUBCUTANEOUS
Qty: 3 EACH | Refills: 5
Start: 2023-05-03 | End: 2023-05-05

## 2023-05-03 RX ORDER — NATEGLINIDE 120 MG/1
TABLET ORAL
Qty: 90 TABLET | Refills: 5 | Status: SHIPPED | OUTPATIENT
Start: 2023-05-03

## 2023-05-03 RX ORDER — INSULIN LISPRO-AABC 100 [IU]/ML
INJECTION, SOLUTION SUBCUTANEOUS
Qty: 10 EACH | Refills: 3 | Status: SHIPPED | OUTPATIENT
Start: 2023-05-03 | End: 2023-05-05

## 2023-05-04 DIAGNOSIS — E11.65 UNCONTROLLED TYPE 2 DIABETES MELLITUS WITH HYPERGLYCEMIA (HCC): Primary | ICD-10-CM

## 2023-05-05 RX ORDER — INSULIN ASPART 100 [IU]/ML
INJECTION, SOLUTION INTRAVENOUS; SUBCUTANEOUS
Qty: 15 ML | Refills: 3 | Status: SHIPPED | OUTPATIENT
Start: 2023-05-05

## 2023-05-05 RX ORDER — INSULIN ASPART 100 [IU]/ML
INJECTION, SOLUTION INTRAVENOUS; SUBCUTANEOUS
Qty: 30 ML | Refills: 3 | Status: SHIPPED | OUTPATIENT
Start: 2023-05-05

## 2023-05-10 ENCOUNTER — TELEPHONE (OUTPATIENT)
Age: 38
End: 2023-05-10

## 2023-05-10 NOTE — TELEPHONE ENCOUNTER
Alternative rx request faxed from [facility]. Alternative needed for [facility] 5mg/0.5mL pen as it is not on pt's formulary. Preferred options include Victoza, Ozempic, Rybelsus, and Trulicity. Dr. Amparo Deleon will be advised.

## 2023-05-21 RX ORDER — INSULIN ASPART 100 [IU]/ML
INJECTION, SOLUTION INTRAVENOUS; SUBCUTANEOUS
COMMUNITY
Start: 2023-05-05

## 2023-05-21 RX ORDER — NATEGLINIDE 120 MG/1
TABLET ORAL
COMMUNITY
Start: 2023-05-03

## 2023-05-21 RX ORDER — ACETAMINOPHEN AND CODEINE PHOSPHATE 120; 12 MG/5ML; MG/5ML
0.35 SOLUTION ORAL
COMMUNITY

## 2023-05-21 RX ORDER — TIRZEPATIDE 5 MG/.5ML
5 INJECTION, SOLUTION SUBCUTANEOUS
COMMUNITY
Start: 2023-05-03

## 2023-05-21 RX ORDER — IRBESARTAN 150 MG/1
150 TABLET ORAL DAILY
COMMUNITY
Start: 2022-07-20

## 2023-08-02 RX ORDER — NORETHINDRONE 0.35 MG/1
TABLET ORAL
Qty: 28 TABLET | Refills: 1 | Status: SHIPPED | OUTPATIENT
Start: 2023-08-02

## 2023-08-03 DIAGNOSIS — Z79.4 TYPE 2 DIABETES MELLITUS WITH HYPERGLYCEMIA, WITH LONG-TERM CURRENT USE OF INSULIN (HCC): Primary | ICD-10-CM

## 2023-08-03 DIAGNOSIS — E11.65 TYPE 2 DIABETES MELLITUS WITH HYPERGLYCEMIA, WITH LONG-TERM CURRENT USE OF INSULIN (HCC): Primary | ICD-10-CM

## 2023-08-03 DIAGNOSIS — E11.65 TYPE 2 DIABETES MELLITUS WITH HYPERGLYCEMIA, WITHOUT LONG-TERM CURRENT USE OF INSULIN (HCC): ICD-10-CM

## 2023-09-06 LAB
HBA1C MFR BLD HPLC: 8.6 %
LDL CHOLESTEROL, EXTERNAL: 112
MICROALBUMIN/CREATININE RATIO, EXTERNAL: 276

## 2023-09-07 ENCOUNTER — TELEPHONE (OUTPATIENT)
Age: 38
End: 2023-09-07

## 2023-09-07 DIAGNOSIS — Z79.4 TYPE 2 DIABETES MELLITUS WITH HYPERGLYCEMIA, WITH LONG-TERM CURRENT USE OF INSULIN (HCC): ICD-10-CM

## 2023-09-07 DIAGNOSIS — E11.65 TYPE 2 DIABETES MELLITUS WITH HYPERGLYCEMIA, WITH LONG-TERM CURRENT USE OF INSULIN (HCC): ICD-10-CM

## 2023-09-07 RX ORDER — ACETAMINOPHEN AND CODEINE PHOSPHATE 120; 12 MG/5ML; MG/5ML
1 SOLUTION ORAL DAILY
Qty: 28 TABLET | Refills: 0 | Status: SHIPPED | OUTPATIENT
Start: 2023-09-07

## 2023-09-07 NOTE — TELEPHONE ENCOUNTER
Patient would like to know if Dr. Nima Pabon could send a refill on her birth control to hold her until her annual exam with Dr. Luly Friend is October.  Patient requested to see the first available provider in October

## 2023-09-11 ENCOUNTER — OFFICE VISIT (OUTPATIENT)
Age: 38
End: 2023-09-11
Payer: COMMERCIAL

## 2023-09-11 VITALS
HEART RATE: 103 BPM | DIASTOLIC BLOOD PRESSURE: 81 MMHG | WEIGHT: 224.8 LBS | BODY MASS INDEX: 38.38 KG/M2 | HEIGHT: 64 IN | SYSTOLIC BLOOD PRESSURE: 134 MMHG | RESPIRATION RATE: 16 BRPM | TEMPERATURE: 97.3 F

## 2023-09-11 DIAGNOSIS — R80.1 PERSISTENT PROTEINURIA, UNSPECIFIED: ICD-10-CM

## 2023-09-11 DIAGNOSIS — I10 ESSENTIAL (PRIMARY) HYPERTENSION: ICD-10-CM

## 2023-09-11 DIAGNOSIS — E11.65 TYPE 2 DIABETES MELLITUS WITH HYPERGLYCEMIA, WITHOUT LONG-TERM CURRENT USE OF INSULIN (HCC): Primary | ICD-10-CM

## 2023-09-11 DIAGNOSIS — E78.2 MIXED HYPERLIPIDEMIA: ICD-10-CM

## 2023-09-11 PROCEDURE — 3075F SYST BP GE 130 - 139MM HG: CPT | Performed by: INTERNAL MEDICINE

## 2023-09-11 PROCEDURE — 99214 OFFICE O/P EST MOD 30 MIN: CPT | Performed by: INTERNAL MEDICINE

## 2023-09-11 PROCEDURE — 3079F DIAST BP 80-89 MM HG: CPT | Performed by: INTERNAL MEDICINE

## 2023-09-11 PROCEDURE — 3052F HG A1C>EQUAL 8.0%<EQUAL 9.0%: CPT | Performed by: INTERNAL MEDICINE

## 2023-09-11 RX ORDER — ATORVASTATIN CALCIUM 10 MG/1
10 TABLET, FILM COATED ORAL DAILY
Qty: 90 TABLET | Refills: 3 | Status: SHIPPED | OUTPATIENT
Start: 2023-09-11

## 2023-09-11 RX ORDER — IRBESARTAN 150 MG/1
150 TABLET ORAL DAILY
Qty: 90 TABLET | Refills: 3 | Status: SHIPPED | OUTPATIENT
Start: 2023-09-11

## 2023-09-11 RX ORDER — NATEGLINIDE 120 MG/1
TABLET ORAL
Qty: 270 TABLET | Refills: 3 | Status: SHIPPED | OUTPATIENT
Start: 2023-09-11 | End: 2023-09-11 | Stop reason: ALTCHOICE

## 2023-09-11 RX ORDER — LIRAGLUTIDE 6 MG/ML
1.2 INJECTION SUBCUTANEOUS DAILY
Qty: 27 ML | Refills: 3 | Status: SHIPPED | OUTPATIENT
Start: 2023-09-11

## 2023-09-11 RX ORDER — INSULIN ASPART 100 [IU]/ML
INJECTION, SOLUTION INTRAVENOUS; SUBCUTANEOUS
Qty: 45 ML | Refills: 3 | Status: SHIPPED | OUTPATIENT
Start: 2023-09-11

## 2023-09-11 NOTE — PROGRESS NOTES
[place] Pascal [place] [id number] [facility] [address]       HISTORY OF PRESENT ILLNESS   Leah Ly is a 45 y.o.  female. HPI   Patient here for  follow up after  Last  visit of Type 2 diabetes mellitus   From  May  2023  H/o DM 2  for 19 years ; HTN  and obesity    No retinopathy perr her , nephropathy present   On OCP,           Lost 1 lbs, no time for TLC   Got a job             March 2023        Continues to be in stress from health related issues    Lost 9 lbs     She is not very interactive today    No meter in hand    Wants sensors,    Not changed her diet much                 Old history        Referred : by self/pcp   H/o diabetes for 15  years    Current A1C is 9.8 %  From oct 2019  and symptoms/problems include polyuria and polydipsia    Current diabetic medications include jardiance , lantus and humalog  And  Metformin 1 gm bid twice a day . Current monitoring regimen: home blood tests - weekly         Review of Systems    Constitutional: Negative. Psychiatric/Behavioral: Negative. Physical Exam    Constitutional: She is oriented to person, place, and time. She appears well-developed and well-nourished. Labs  Reviewed on [facility] link   from sept 2023          ASSESSMENT and PLAN      1.   Type 2 DM, un controlled : a1c is 8.6 %   from   sept 2023   compared to    8.7 %    from      April 2023    compared to   10.3  %   from  feb 2023    compared  to    11.5 %    from  nov 2022   compared to   8.8 %    From   Today  by POC  Compared to     8.1 %    From     Nov 2021     Compared to    11 %   From     August 2021   Compared to    9.8 %      From    May 2021   compared to     8.6 %    From august 2020    Compared to  10. 5  %      From feb 2020    Compared  To     9.8 %   From  Oct 2019     Behaviour of type one     Sept 2023   Maintained control    She got dexcom in hand and she is approved for  pump, STILL not ready    Not able to

## 2023-09-11 NOTE — PATIENT INSTRUCTIONS
SPECIFIC INSTRUCTIONS BELOW        DRINK water   Do not skip meals  Do not eat in between meals     Reduce carbs- pasta, rice, potatoes, bread   Try to avoid processed bread products like BISCUITS, CROISSANTS, MUFFINS     Do not drink juices or sodas, even if they are calorie zero or diet drinks and especially avoid using powders like crystalloids , LESLY-AIDS      Do not eat peanut butter      Do not eat sugar free cookies and cakes   Do not eat peaches, oranges, pineapples, raisins, grapes , canteloupe , honey dew, mangoes , watermelon  and fruit medleys        -------------------------------------------------------------------------------------------------------------------       Start on lipitor 10 mg hs     Stay  On  jardiance  And  Metformin      Stay on  victoza at 1.2 mg a day       Stay     on    levemir    60 units a day      Stop  starlix          Take novolog 8 units before each meal and add SSI as follows   If blood sugars are[de-identified]     150-200 mg 2 units     201-250 mg 4 units     251-300 mg 6 units     301-350 mg 8 units     351-400 mg 10 units     401-450 mg 12 units     451-500 mg 14 units     Less than 70 mg NO INSULIN              -------------PAY ATTENTION TO THESE GENERAL INSTRUCTIONS -----------------      - The medications prescribed at this visit will not be available at pharmacy until 6 pm       - YOUR MED LIST IS NOT UP TO DATE AS SOME CHANGES ARE BEING MADE AFTER THE VISIT - FOLLOW SPECIFIC INSTRUCTIONS  ABOVE     -ANY tests other than blood work, which you opt to do  outside the  [facility] imaging facilities, you are responsible for prior authorizations if  required    - [address] UP TO DATE ON YOUR AVS- PLEASE IGNORE     Results     *Normal results will not be notified by a phone call starting January 1 2021   *If you have an upcoming visit, the results will be discussed at the visit   *Please sign up for MY CHART if you want access to your lab and test

## 2023-10-02 ENCOUNTER — OFFICE VISIT (OUTPATIENT)
Age: 38
End: 2023-10-02
Payer: COMMERCIAL

## 2023-10-02 VITALS
HEART RATE: 105 BPM | HEIGHT: 60 IN | TEMPERATURE: 97.3 F | BODY MASS INDEX: 44.26 KG/M2 | DIASTOLIC BLOOD PRESSURE: 100 MMHG | OXYGEN SATURATION: 100 % | WEIGHT: 225.44 LBS | SYSTOLIC BLOOD PRESSURE: 132 MMHG | RESPIRATION RATE: 16 BRPM

## 2023-10-02 DIAGNOSIS — Z12.4 SCREENING FOR MALIGNANT NEOPLASM OF CERVIX: Primary | ICD-10-CM

## 2023-10-02 DIAGNOSIS — Z01.419 WELL WOMAN EXAM WITH ROUTINE GYNECOLOGICAL EXAM: ICD-10-CM

## 2023-10-02 PROCEDURE — 99395 PREV VISIT EST AGE 18-39: CPT | Performed by: OBSTETRICS & GYNECOLOGY

## 2023-10-02 RX ORDER — ACETAMINOPHEN AND CODEINE PHOSPHATE 120; 12 MG/5ML; MG/5ML
1 SOLUTION ORAL DAILY
Qty: 84 TABLET | Refills: 3 | Status: SHIPPED | OUTPATIENT
Start: 2023-10-02

## 2023-10-02 RX ORDER — NATEGLINIDE 120 MG/1
TABLET ORAL
COMMUNITY
Start: 2023-09-27

## 2023-10-02 NOTE — PROGRESS NOTES
HISTORY OF PRESENT ILLNESS  Jalen Foreman is a 45 y.o. female who presents today for the following:  Chief Complaint   Patient presents with    Annual Exam     C/O pain in right side   Patient is a 27-year-old  A2 female who presents today for routine annual exam.  Joseph Katherinus that she is having some right-sided discomfort on presentation today but further states that she has uterine fibroids and believes this to be the cause. She denies any anorexia nausea vomiting or fevers. [id number]    No Known Allergies    Current Outpatient Medications   Medication Sig Dispense Refill    empagliflozin (JARDIANCE) 25 MG tablet Take 1 tablet by mouth daily 90 tablet 3    insulin detemir (LEVEMIR) 100 UNIT/ML injection pen Inject 60 units at bedtime 90 mL 3    irbesartan (AVAPRO) 150 MG tablet Take 1 tablet by mouth daily 90 tablet 3    insulin aspart (NOVOLOG FLEXPEN) 100 UNIT/ML injection pen 8 units before meals  plus SSI  to max dose of   50 units a day 45 mL 3    Liraglutide (VICTOZA) 18 MG/3ML SOPN SC injection Inject 1.2 mg into the skin daily 27 mL 3    norethindrone (JENCYCLA) 0.35 MG tablet Take 1 tablet by mouth daily 28 tablet 0    metFORMIN (GLUCOPHAGE) 1000 MG tablet Take 1 tablet by mouth 2 times daily (with meals) 180 tablet 3    nateglinide (STARLIX) 120 MG tablet       diclofenac sodium (VOLTAREN) 1 % GEL Apply 2 g topically 4 times daily as needed       No current facility-administered medications for this visit.         Past Medical History:   Diagnosis Date    DM (diabetes mellitus) ([address] )     History of MRI 2021    negative breast MRI    Ovarian cyst 2021    [facility]    Proliferative diabetic retinopathy [facility])     Retinal detachment 2017    left eye       Past Surgical History:   Procedure Laterality Date    ANKLE FRACTURE SURGERY  2011     SECTION  2008    RETINAL DETACHMENT SURGERY  2017    TONSILLECTOMY         Family History   Problem Relation Age of

## 2023-10-05 LAB
., LABCORP: NORMAL
C TRACH RRNA CVX QL NAA+PROBE: NEGATIVE
CYTOLOGIST CVX/VAG CYTO: NORMAL
CYTOLOGY CVX/VAG DOC CYTO: NORMAL
CYTOLOGY CVX/VAG DOC THIN PREP: NORMAL
DX ICD CODE: NORMAL
Lab: NORMAL
Lab: NORMAL
N GONORRHOEA RRNA CVX QL NAA+PROBE: NEGATIVE
OTHER STN SPEC: NORMAL
STAT OF ADQ CVX/VAG CYTO-IMP: NORMAL
T VAGINALIS RRNA SPEC QL NAA+PROBE: NEGATIVE

## 2023-10-16 ENCOUNTER — OFFICE VISIT (OUTPATIENT)
Age: 38
End: 2023-10-16
Payer: COMMERCIAL

## 2023-10-16 DIAGNOSIS — Z79.4 TYPE 2 DIABETES MELLITUS WITH HYPERGLYCEMIA, WITH LONG-TERM CURRENT USE OF INSULIN (HCC): Primary | ICD-10-CM

## 2023-10-16 DIAGNOSIS — E11.65 TYPE 2 DIABETES MELLITUS WITH HYPERGLYCEMIA, WITH LONG-TERM CURRENT USE OF INSULIN (HCC): Primary | ICD-10-CM

## 2023-10-16 PROCEDURE — G0108 DIAB MANAGE TRN  PER INDIV: HCPCS

## 2023-10-16 NOTE — PROGRESS NOTES
determine if my medication or insulin doses are correct. 6  5. I know what to do to prevent a low blood sugar when I exercise (either before, during, or after). 6  6. When I am sick, I know what to do differently with my diabetes management. 5  7. I know how stress can affect my diabetes management. 6  8. When I look at my blood sugars over a given week, I can explain what my blood sugar pattern is. 5  9. I know what my target levels are for A1c, blood pressure and cholesterol. 6  Confidence Questions  1. I am confident that I can plan balanced meals and snacks. 6  2. I am confident that I can manage my stress. 6  3. I am confident that I can prevent a low blood sugar during or after exercise. 6  4. I am confident that the next time I eat out, I will be able to choose foods that best keep my blood sugars in target. 6  5. I am confident I can include exercise into my schedule. 6  6. I am confident that I can use my daily blood sugars to adjust my diet, my activity, and/or my insulin. 6  7. When something out of my normal routine happens, I am confident that I can problem-solve and keep my diabetes on track. 6  Preparedness Questions  1. Within the next month, I will begin to eat more balanced meals and snacks. 6  2. Within the next month, I will choose an exercise activity and I will start fitting it into my schedule. 6  3. Within the next month, I will make a list of stress management options that work for me. 6  4. Within the next month, I will consistently plan ahead to prevent low blood sugars. 6  5. Within the next month, I will start adjusting my insulin doses on my own. 6  6. Within the next month, I will begin making changes to my diabetes management based on my daily blood sugars (eg - eating, activity and/or insulin). 6  7. Within the next month, I will begin making changes to my diabetes management to meet my overall goals (eg - eating, activity and/or insulin).  6

## 2023-11-07 DIAGNOSIS — E11.65 TYPE 2 DIABETES MELLITUS WITH HYPERGLYCEMIA, WITHOUT LONG-TERM CURRENT USE OF INSULIN (HCC): ICD-10-CM

## 2023-11-07 RX ORDER — ACETAMINOPHEN AND CODEINE PHOSPHATE 120; 12 MG/5ML; MG/5ML
1 SOLUTION ORAL DAILY
Qty: 84 TABLET | Refills: 3 | OUTPATIENT
Start: 2023-11-07

## 2023-11-07 RX ORDER — INSULIN ASPART 100 [IU]/ML
INJECTION, SOLUTION INTRAVENOUS; SUBCUTANEOUS
Qty: 45 ML | Refills: 3 | Status: SHIPPED | OUTPATIENT
Start: 2023-11-07

## 2023-11-07 RX ORDER — ACETAMINOPHEN AND CODEINE PHOSPHATE 120; 12 MG/5ML; MG/5ML
1 SOLUTION ORAL DAILY
Qty: 84 TABLET | Refills: 0 | OUTPATIENT
Start: 2023-11-07

## 2023-11-09 NOTE — TELEPHONE ENCOUNTER
Call pharmacy  and  see what this message means  ?     Send to her office  - whose office     I am not understanding  the  pt  comment     Mellissa Enciso MD

## 2023-11-11 DIAGNOSIS — I10 ESSENTIAL (PRIMARY) HYPERTENSION: ICD-10-CM

## 2023-11-11 DIAGNOSIS — E11.65 TYPE 2 DIABETES MELLITUS WITH HYPERGLYCEMIA, WITHOUT LONG-TERM CURRENT USE OF INSULIN (HCC): ICD-10-CM

## 2023-11-11 DIAGNOSIS — E78.2 MIXED HYPERLIPIDEMIA: ICD-10-CM

## 2023-12-01 ENCOUNTER — TELEPHONE (OUTPATIENT)
Age: 38
End: 2023-12-01

## 2023-12-01 DIAGNOSIS — I10 ESSENTIAL (PRIMARY) HYPERTENSION: ICD-10-CM

## 2023-12-01 DIAGNOSIS — E11.65 TYPE 2 DIABETES MELLITUS WITH HYPERGLYCEMIA, WITHOUT LONG-TERM CURRENT USE OF INSULIN (HCC): ICD-10-CM

## 2023-12-01 RX ORDER — IRBESARTAN 150 MG/1
150 TABLET ORAL DAILY
Qty: 90 TABLET | Refills: 3 | Status: SHIPPED | OUTPATIENT
Start: 2023-12-01

## 2023-12-01 NOTE — TELEPHONE ENCOUNTER
Spoke with patient, she needed Rx to go to [facility].   She no longer needs this has been taken care of, can be deleted (the order)

## 2023-12-10 LAB
ALBUMIN SERPL-MCNC: 4.3 G/DL (ref 3.9–4.9)
ALBUMIN/CREAT UR: 135 MG/G CREAT (ref 0–29)
ALBUMIN/GLOB SERPL: 1.3 {RATIO} (ref 1.2–2.2)
ALP SERPL-CCNC: 83 IU/L (ref 44–121)
ALT SERPL-CCNC: 18 IU/L (ref 0–32)
AST SERPL-CCNC: 16 IU/L (ref 0–40)
BILIRUB SERPL-MCNC: 0.3 MG/DL (ref 0–1.2)
BUN SERPL-MCNC: 17 MG/DL (ref 6–20)
BUN/CREAT SERPL: 25 (ref 9–23)
CALCIUM SERPL-MCNC: 9.9 MG/DL (ref 8.7–10.2)
CHLORIDE SERPL-SCNC: 101 MMOL/L (ref 96–106)
CHOLEST SERPL-MCNC: 201 MG/DL (ref 100–199)
CO2 SERPL-SCNC: 21 MMOL/L (ref 20–29)
CREAT SERPL-MCNC: 0.68 MG/DL (ref 0.57–1)
CREAT UR-MCNC: 82.3 MG/DL
EGFRCR SERPLBLD CKD-EPI 2021: 114 ML/MIN/1.73
GLOBULIN SER CALC-MCNC: 3.2 G/DL (ref 1.5–4.5)
GLUCOSE SERPL-MCNC: 144 MG/DL (ref 70–99)
HDLC SERPL-MCNC: 41 MG/DL
LDLC SERPL CALC-MCNC: 134 MG/DL (ref 0–99)
MICROALBUMIN UR-MCNC: 111.2 UG/ML
POTASSIUM SERPL-SCNC: 4.5 MMOL/L (ref 3.5–5.2)
PROT SERPL-MCNC: 7.5 G/DL (ref 6–8.5)
SODIUM SERPL-SCNC: 138 MMOL/L (ref 134–144)
TRIGL SERPL-MCNC: 146 MG/DL (ref 0–149)
VLDLC SERPL CALC-MCNC: 26 MG/DL (ref 5–40)

## 2023-12-11 LAB
HBA1C MFR BLD: 9.6 % (ref 4.8–5.6)
IMP & REVIEW OF LAB RESULTS: NORMAL
Lab: NORMAL

## 2024-02-19 DIAGNOSIS — E11.65 TYPE 2 DIABETES MELLITUS WITH HYPERGLYCEMIA, WITHOUT LONG-TERM CURRENT USE OF INSULIN (HCC): Primary | ICD-10-CM

## 2024-02-19 RX ORDER — INSULIN GLARGINE 300 U/ML
INJECTION, SOLUTION SUBCUTANEOUS
Qty: 18 ML | Refills: 3 | Status: SHIPPED | OUTPATIENT
Start: 2024-02-19

## 2024-03-26 ENCOUNTER — OFFICE VISIT (OUTPATIENT)
Age: 39
End: 2024-03-26
Payer: COMMERCIAL

## 2024-03-26 VITALS
RESPIRATION RATE: 18 BRPM | HEART RATE: 106 BPM | SYSTOLIC BLOOD PRESSURE: 157 MMHG | BODY MASS INDEX: 44.37 KG/M2 | DIASTOLIC BLOOD PRESSURE: 86 MMHG | HEIGHT: 60 IN | OXYGEN SATURATION: 98 % | WEIGHT: 226 LBS

## 2024-03-26 DIAGNOSIS — R10.2 PELVIC PAIN: Primary | ICD-10-CM

## 2024-03-26 DIAGNOSIS — N89.8 VAGINAL DISCHARGE: ICD-10-CM

## 2024-03-26 PROCEDURE — 99213 OFFICE O/P EST LOW 20 MIN: CPT | Performed by: OBSTETRICS & GYNECOLOGY

## 2024-03-26 NOTE — PATIENT INSTRUCTIONS
Patient Education        Pelvic Pain: Care Instructions  Your Care Instructions     Pelvic pain, or pain in the lower belly, can have many causes. Often pelvic pain is not serious and gets better in a few days. If your pain continues or gets worse, you may need tests and treatment. Tell your doctor about any new symptoms. These may be signs of a serious problem.  Follow-up care is a key part of your treatment and safety. Be sure to make and go to all appointments, and call your doctor if you are having problems. It's also a good idea to know your test results and keep a list of the medicines you take.  How can you care for yourself at home?  Rest until you feel better. Lie down, and raise your legs by placing a pillow under your knees.  Drink plenty of fluids. You may find that small, frequent sips are easier on your stomach than if you drink a lot at once. Avoid drinks with carbonation or caffeine, such as soda pop, tea, or coffee.  Try eating several small meals instead of 2 or 3 large ones. Eat mild foods, such as rice, dry toast or crackers, bananas, and applesauce. Avoid fatty and spicy foods, other fruits, and alcohol until 48 hours after your symptoms have gone away.  Take an over-the-counter pain medicine, such as acetaminophen (Tylenol), ibuprofen (Advil, Motrin), or naproxen (Aleve). Read and follow all instructions on the label.  Do not take two or more pain medicines at the same time unless the doctor told you to. Many pain medicines have acetaminophen, which is Tylenol. Too much acetaminophen (Tylenol) can be harmful.  You can put a heating pad, a warm cloth, or moist heat on your belly to relieve pain.  When should you call for help?   Call 911  anytime you think you may need emergency care. For example, call if:    You passed out (lost consciousness).   Call your doctor now or seek immediate medical care if:    You have a new or higher fever.     You have unusual vaginal bleeding.     You have new or

## 2024-03-27 NOTE — PROGRESS NOTES
Age of Onset   • No Known Problems Daughter    • Breast Cancer Sister    • Hypertension Mother    • Breast Cancer Sister    • Diabetes Father    • Cancer Sister         breast cancer       Social History     Socioeconomic History   • Marital status:      Spouse name: Not on file   • Number of children: Not on file   • Years of education: Not on file   • Highest education level: Not on file   Occupational History   • Not on file   Tobacco Use   • Smoking status: Never   • Smokeless tobacco: Never   Vaping Use   • Vaping Use: Never used   Substance and Sexual Activity   • Alcohol use: Never   • Drug use: Never   • Sexual activity: Yes     Birth control/protection: Pill   Other Topics Concern   • Not on file   Social History Narrative   • Not on file     Social Determinants of Health     Financial Resource Strain: Not on file   Food Insecurity: Not on file   Transportation Needs: Not on file   Physical Activity: Not on file   Stress: Not on file   Social Connections: Not on file   Intimate Partner Violence: Not on file   Housing Stability: Not on file         Pelvic Pain    Reported by patient. Location: lower pelvis  Quality: aching; cramping; pressure; fullness/bloating   Severity: interferes with normal activities   Intermittent through out the day - almost daily previously bilaterally but currently symptoms are right lower abdomen/pelvis  Associated Symptoms: radiation to the back ; no chills; no constipation; no diarrhea; no vaginal discharge; no pain with urination; normal emptying of bladder; no feelings of urgency; no blood in the urine; normal libido; no fever; no nausea; no vomiting; no nocturia;     Review of Systems   Genitourinary:  Positive for pelvic pain.   All other systems reviewed and are negative.       BP (!) 157/86 (Site: Right Upper Arm, Position: Sitting)   Pulse (!) 106   Resp 18   Ht 1.524 m (5')   Wt 102.5 kg (226 lb)   LMP 03/06/2024   SpO2 98%   BMI 44.14 kg/m²   OBGyn Exam

## 2024-04-15 ENCOUNTER — OFFICE VISIT (OUTPATIENT)
Age: 39
End: 2024-04-15
Payer: COMMERCIAL

## 2024-04-15 VITALS
HEART RATE: 99 BPM | BODY MASS INDEX: 43.98 KG/M2 | WEIGHT: 224 LBS | RESPIRATION RATE: 18 BRPM | HEIGHT: 60 IN | OXYGEN SATURATION: 95 % | DIASTOLIC BLOOD PRESSURE: 82 MMHG | SYSTOLIC BLOOD PRESSURE: 145 MMHG

## 2024-04-15 DIAGNOSIS — R10.2 PELVIC PAIN: Primary | ICD-10-CM

## 2024-04-15 DIAGNOSIS — D21.9 FIBROID: ICD-10-CM

## 2024-04-15 PROCEDURE — 99213 OFFICE O/P EST LOW 20 MIN: CPT | Performed by: OBSTETRICS & GYNECOLOGY

## 2024-04-30 NOTE — PROGRESS NOTES
Elvia Nowak is a 39 y.o. female, , Patient's last menstrual period was 2024., who presents today for the following:  Chief Complaint   Patient presents with   • Follow-up     Discuss ultrasound results.        No Known Allergies    Current Outpatient Medications   Medication Sig Dispense Refill   • terconazole (TERAZOL 7) 0.4 % vaginal cream Place vaginally nightly. 45 g 0   • TOUJEO MAX SOLOSTAR 300 UNIT/ML SOPN 60 units at bed time 18 mL 3   • Empagliflozin-metFORMIN HCl (SYNJARDY) 12.5-1000 MG TABS One pill twice a day with meals    stop jardiance and metformin (Patient not taking: Reported on 3/26/2024) 180 tablet 1   • empagliflozin (JARDIANCE) 25 MG tablet Take 1 tablet by mouth daily (Patient not taking: Reported on 3/26/2024) 90 tablet 3   • irbesartan (AVAPRO) 150 MG tablet Take 1 tablet by mouth daily 90 tablet 3   • insulin aspart (NOVOLOG FLEXPEN) 100 UNIT/ML injection pen 8 units before meals  plus SSI  to max dose of   50 units a day 45 mL 3   • norethindrone (JENCYCLA) 0.35 MG tablet Take 1 tablet by mouth daily 84 tablet 3   • Liraglutide (VICTOZA) 18 MG/3ML SOPN SC injection Inject 1.2 mg into the skin daily 27 mL 3   • metFORMIN (GLUCOPHAGE) 1000 MG tablet Take 1 tablet by mouth 2 times daily (with meals) 180 tablet 3   • diclofenac sodium (VOLTAREN) 1 % GEL Apply 2 g topically 4 times daily as needed (Patient not taking: Reported on 3/26/2024)       No current facility-administered medications for this visit.         Past Medical History:   Diagnosis Date   • DM (diabetes mellitus) (HCC)    • History of MRI 2021    negative breast MRI   • Ovarian [facility] 2021    [facility]   • Proliferative diabetic retinopathy ([facility])    • Retinal detachment 2017    left eye       Past Surgical History:   Procedure Laterality Date   • ANKLE FRACTURE SURGERY     •  SECTION     • RETINAL DETACHMENT SURGERY  2017   • TONSILLECTOMY         Family

## 2024-05-20 ENCOUNTER — OFFICE VISIT (OUTPATIENT)
Age: 39
End: 2024-05-20
Payer: COMMERCIAL

## 2024-05-20 VITALS
HEIGHT: 64 IN | DIASTOLIC BLOOD PRESSURE: 74 MMHG | SYSTOLIC BLOOD PRESSURE: 135 MMHG | TEMPERATURE: 98.8 F | HEART RATE: 98 BPM | RESPIRATION RATE: 16 BRPM | BODY MASS INDEX: 37.9 KG/M2 | OXYGEN SATURATION: 100 % | WEIGHT: 222 LBS

## 2024-05-20 DIAGNOSIS — Z79.4 ENCOUNTER FOR LONG-TERM (CURRENT) USE OF INSULIN (HCC): ICD-10-CM

## 2024-05-20 DIAGNOSIS — E78.2 MIXED HYPERLIPIDEMIA: ICD-10-CM

## 2024-05-20 DIAGNOSIS — E11.65 TYPE 2 DIABETES MELLITUS WITH HYPERGLYCEMIA, WITHOUT LONG-TERM CURRENT USE OF INSULIN (HCC): Primary | ICD-10-CM

## 2024-05-20 DIAGNOSIS — I10 ESSENTIAL (PRIMARY) HYPERTENSION: ICD-10-CM

## 2024-05-20 DIAGNOSIS — R80.1 PERSISTENT PROTEINURIA, UNSPECIFIED: ICD-10-CM

## 2024-05-20 PROCEDURE — 3078F DIAST BP <80 MM HG: CPT | Performed by: INTERNAL MEDICINE

## 2024-05-20 PROCEDURE — 3075F SYST BP GE 130 - 139MM HG: CPT | Performed by: INTERNAL MEDICINE

## 2024-05-20 PROCEDURE — 99215 OFFICE O/P EST HI 40 MIN: CPT | Performed by: INTERNAL MEDICINE

## 2024-05-20 PROCEDURE — 3046F HEMOGLOBIN A1C LEVEL >9.0%: CPT | Performed by: INTERNAL MEDICINE

## 2024-05-20 RX ORDER — ATORVASTATIN CALCIUM 10 MG/1
10 TABLET, FILM COATED ORAL NIGHTLY
COMMUNITY
Start: 2024-04-02

## 2024-05-20 RX ORDER — SEMAGLUTIDE 0.68 MG/ML
INJECTION, SOLUTION SUBCUTANEOUS
Qty: 9 ML | Refills: 2 | Status: SHIPPED | OUTPATIENT
Start: 2024-05-20

## 2024-05-20 NOTE — PROGRESS NOTES
[facility]                Hoda Reeves MD FACE       HISTORY OF PRESENT ILLNESS   Elvia Nowak is a 39   y.o.  female.   HPI   Patient here for  follow up after  Last  visit of Type 2 diabetes mellitus   From  Dec    2023  H/o DM 2  for 19 years ; HTN  and obesity    No retinopathy per  her , nephropathy present   On OCP,         In the interim, she is found to have cysts  on  ovaries  and  fibroids  in uterus, going for surgery  No meter in hand , No paul sensors           March 2023      Continues to be in stress from health related issues    Lost 9 lbs     She is not very interactive today    No meter in hand    Wants sensors,    Not changed her diet much            Old history      Referred : by self/pcp   H/o diabetes for 15  years    Current A1C is 9.8 %  From oct 2019  and symptoms/problems include polyuria and polydipsia    Current diabetic medications include jardiance , lantus and humalog  And  Metformin 1 gm bid twice a day .    Current monitoring regimen: home blood tests - weekly         Review of Systems    Constitutional: Negative.     Psychiatric/Behavioral: Negative.           Physical Exam    Constitutional: She is oriented to person, place, and time. She appears well-developed and well-nourished.          Labs    Lab Results   Component Value Date    LABA1C 12.4 (H) 05/04/2024    LABA1C 9.6 (H) 12/09/2023    LABA1C 8.7 (H) 04/22/2023     Lab Results   Component Value Date     05/04/2024    K 4.1 05/04/2024    CL 96 05/04/2024    CO2 19 (L) 05/04/2024    BUN 14 05/04/2024    CREATININE 0.70 05/04/2024    GLUCOSE 376 (H) 05/04/2024    CALCIUM 9.8 05/04/2024    LABALBU 544.1 05/04/2024    BILITOT 0.4 05/04/2024    ALKPHOS 99 05/04/2024    AST 19 05/04/2024    ALT 26 05/04/2024    LABGLOM 113 05/04/2024    GFRAA 139 11/27/2021    AGRATIO 1.5 05/04/2024    CHOL 162 05/04/2024    TRIG 293 (H) 05/04/2024    LDL 77 05/04/2024    HDL 37 (L) 05/04/2024

## 2024-05-20 NOTE — PROGRESS NOTES
Chief Complaint   Patient presents with    Diabetes    Follow-up        /74 (Site: Left Upper Arm, Position: Sitting, Cuff Size: Large Adult)   Pulse 98   Temp 98.8 °F (37.1 °C) (Oral)   Resp 16   Ht 1.626 m (5' 4\")   Wt 100.7 kg (222 lb)   SpO2 100%   BMI 38.11 kg/m²      1. Have you been to the ER, urgent care clinic since your last visit?  Hospitalized since your last visit?No    2. Have you seen or consulted any other health care providers outside of the [facility] since your last visit?  Include any pap smears or colon screening. No

## 2024-05-20 NOTE — PATIENT INSTRUCTIONS
SPECIFIC INSTRUCTIONS BELOW        DRINK water   Do not skip meals  Do not eat in between meals     Reduce carbs- pasta, rice, potatoes, bread   Try to avoid processed bread products like BISCUITS, CROISSANTS, MUFFINS     Do not drink juices or sodas, even if they are calorie zero or diet drinks and especially avoid using powders like crystalloids , LESLY-AIDS      Do not eat peanut butter      Do not eat sugar free cookies and cakes   Do not eat peaches, oranges, pineapples, raisins, grapes , canteloupe , honey dew, mangoes , watermelon  and fruit medleys        --------------------------------------------------------------------------------------------------------    Medtronic , tandem  and omnipod videos       Stay  on   Metformin       Start on ozempic  @  0.25 mg  a week  for  4 weeks  and then go for  0.5 mg dose        Stay     on  toujeo   70 units a day      Take novolog 10  units before each meal and add SSI as follows   If blood sugars are::  (     5 gm : 1 units )      150-200 mg 2 units     201-250 mg 4 units     251-300 mg 6 units     301-350 mg 8 units     351-400 mg 10 units     401-450 mg 12 units     451-500 mg 14 units     Less than 70 mg NO INSULIN              -------------PAY ATTENTION TO THESE GENERAL INSTRUCTIONS -----------------      - The medications prescribed at this visit will not be available at pharmacy until 6 pm       - YOUR MED LIST IS NOT UP TO DATE AS SOME CHANGES ARE BEING MADE AFTER THE VISIT - FOLLOW SPECIFIC INSTRUCTIONS  ABOVE     -ANY tests other than blood work, which you opt to do  outside the  [facility] imaging facilities, you are responsible for prior authorizations if  required    - HEALTH MAINTENANCE IS NOT GOING TO BE UP TO DATE ON YOUR AVS- PLEASE IGNORE     Results     *Normal results will not be notified by a phone call starting January 1 2021   *If you have an upcoming visit, the results will be discussed at the visit   *Please sign up for MY CHART if you want

## 2024-05-21 ENCOUNTER — OFFICE VISIT (OUTPATIENT)
Age: 39
End: 2024-05-21
Payer: COMMERCIAL

## 2024-05-21 VITALS
RESPIRATION RATE: 18 BRPM | WEIGHT: 220 LBS | OXYGEN SATURATION: 98 % | HEART RATE: 93 BPM | BODY MASS INDEX: 37.56 KG/M2 | HEIGHT: 64 IN | SYSTOLIC BLOOD PRESSURE: 143 MMHG | DIASTOLIC BLOOD PRESSURE: 84 MMHG

## 2024-05-21 DIAGNOSIS — N83.201 CYSTS OF BOTH OVARIES: Primary | ICD-10-CM

## 2024-05-21 DIAGNOSIS — N83.202 CYSTS OF BOTH OVARIES: Primary | ICD-10-CM

## 2024-05-21 PROCEDURE — 99213 OFFICE O/P EST LOW 20 MIN: CPT | Performed by: OBSTETRICS & GYNECOLOGY

## 2024-05-22 ENCOUNTER — TELEPHONE (OUTPATIENT)
Age: 39
End: 2024-05-22

## 2024-05-22 NOTE — TELEPHONE ENCOUNTER
Contacted Dr. Peña Pavon office to see if they could send over office notes for this patients office visit.  Per staff patient notes will be seen and she is scheduled for MRI on May 31 at [facility] and they will let her know next step after that.

## 2024-06-17 ENCOUNTER — OFFICE VISIT (OUTPATIENT)
Age: 39
End: 2024-06-17
Payer: COMMERCIAL

## 2024-06-17 VITALS
HEIGHT: 64 IN | WEIGHT: 226.19 LBS | BODY MASS INDEX: 38.62 KG/M2 | SYSTOLIC BLOOD PRESSURE: 143 MMHG | DIASTOLIC BLOOD PRESSURE: 85 MMHG

## 2024-06-17 DIAGNOSIS — R10.2 PELVIC PAIN IN FEMALE: ICD-10-CM

## 2024-06-17 DIAGNOSIS — D21.9 FIBROIDS: ICD-10-CM

## 2024-06-17 DIAGNOSIS — N92.6 IRREGULAR MENSES: Primary | ICD-10-CM

## 2024-06-17 PROBLEM — E66.01 MORBID OBESITY (HCC): Status: ACTIVE | Noted: 2024-01-10

## 2024-06-17 PROBLEM — I10 BENIGN HYPERTENSION: Status: ACTIVE | Noted: 2024-01-10

## 2024-06-17 PROBLEM — H40.053 BILATERAL OCULAR HYPERTENSION: Status: ACTIVE | Noted: 2023-10-27

## 2024-06-17 PROBLEM — Z82.49 FAMILY HISTORY OF PREMATURE CORONARY HEART DISEASE: Status: ACTIVE | Noted: 2024-01-10

## 2024-06-17 PROBLEM — E11.3592 PROLIFERATIVE DIABETIC RETINOPATHY OF LEFT EYE WITHOUT MACULAR EDEMA ASSOCIATED WITH TYPE 2 DIABETES MELLITUS (HCC): Status: ACTIVE | Noted: 2023-12-22

## 2024-06-17 PROBLEM — H53.9 VISUAL DISTURBANCE: Status: ACTIVE | Noted: 2023-10-27

## 2024-06-17 PROBLEM — R00.0 SINUS TACHYCARDIA: Status: ACTIVE | Noted: 2024-01-10

## 2024-06-17 PROBLEM — R53.83 FATIGUE: Status: ACTIVE | Noted: 2024-01-10

## 2024-06-17 PROBLEM — R00.2 PALPITATIONS: Status: ACTIVE | Noted: 2024-01-10

## 2024-06-17 PROBLEM — E78.5 HYPERLIPIDEMIA: Status: ACTIVE | Noted: 2024-01-10

## 2024-06-17 PROBLEM — E11.9 TYPE 2 DIABETES MELLITUS (HCC): Status: ACTIVE | Noted: 2024-01-10

## 2024-06-17 PROBLEM — E11.3391 MODERATE NONPROLIFERATIVE DIABETIC RETINOPATHY OF RIGHT EYE WITHOUT MACULAR EDEMA ASSOCIATED WITH TYPE 2 DIABETES MELLITUS (HCC): Status: ACTIVE | Noted: 2023-12-22

## 2024-06-17 PROCEDURE — 3079F DIAST BP 80-89 MM HG: CPT | Performed by: OBSTETRICS & GYNECOLOGY

## 2024-06-17 PROCEDURE — 99214 OFFICE O/P EST MOD 30 MIN: CPT | Performed by: OBSTETRICS & GYNECOLOGY

## 2024-06-17 PROCEDURE — 3077F SYST BP >= 140 MM HG: CPT | Performed by: OBSTETRICS & GYNECOLOGY

## 2024-06-17 RX ORDER — TACROLIMUS 1 MG/G
OINTMENT TOPICAL
COMMUNITY

## 2024-06-17 RX ORDER — OFLOXACIN 3 MG/ML
SOLUTION/ DROPS OPHTHALMIC
COMMUNITY

## 2024-06-17 RX ORDER — MOMETASONE FUROATE 1 MG/G
OINTMENT TOPICAL
COMMUNITY
Start: 2023-06-24

## 2024-06-17 RX ORDER — CHLORHEXIDINE GLUCONATE ORAL RINSE 1.2 MG/ML
SOLUTION DENTAL
COMMUNITY

## 2024-06-17 RX ORDER — INSULIN GLARGINE 300 U/ML
INJECTION, SOLUTION SUBCUTANEOUS
COMMUNITY
Start: 2024-05-18

## 2024-06-17 RX ORDER — FLUCONAZOLE 150 MG/1
TABLET ORAL
COMMUNITY
Start: 2023-12-09

## 2024-06-17 NOTE — PROGRESS NOTES
breast MRI    Hypertension     Ovarian cyst 2021    [facility]    Proliferative diabetic retinopathy (HCC)     Retinal detachment 2017    left eye    Type 2 diabetes mellitus without complication (HCC)        Past Surgical History:   Procedure Laterality Date    ANKLE FRACTURE SURGERY  2011     SECTION      RETINAL DETACHMENT SURGERY  2017    TONSILLECTOMY  1997       Family History   Problem Relation Age of Onset    No Known Problems Daughter     Breast Cancer Sister     Hypertension Mother     Breast Cancer Sister     Cancer Sister         breast cancer    Diabetes Father     Cancer Maternal Grandmother     Breast Cancer Paternal Grandfather        Social History     Socioeconomic History    Marital status:      Spouse name: Not on file    Number of children: Not on file    Years of education: Not on file    Highest education level: Not on file   Occupational History    Not on file   Tobacco Use    Smoking status: Never    Smokeless tobacco: Never   Vaping Use    Vaping Use: Never used   Substance and Sexual Activity    Alcohol use: Never    Drug use: Never    Sexual activity: Yes     Partners: Male     Birth control/protection: Pill   Other Topics Concern    Not on file   Social History Narrative    Not on file     Social Determinants of Health     Financial Resource Strain: Not on file   Food Insecurity: Not on file   Transportation Needs: Not on file   Physical Activity: Not on file   Stress: Not on file   Social Connections: Not on file   Intimate Partner Violence: Not on file   Housing Stability: Not on file         Review of Systems     Review of Systems   Constitutional: Negative.    HENT: Negative.    Eyes: Negative.    Respiratory: Negative.    Cardiovascular: Negative.    Gastrointestinal: Negative.    Genitourinary: Negative.    Musculoskeletal: Negative.    Skin: Negative.    Neurological: Negative.    Endo/Heme/Allergies: Negative.

## 2024-07-01 DIAGNOSIS — Z79.4 TYPE 2 DIABETES MELLITUS WITH HYPERGLYCEMIA, WITH LONG-TERM CURRENT USE OF INSULIN (HCC): ICD-10-CM

## 2024-07-01 DIAGNOSIS — E11.65 TYPE 2 DIABETES MELLITUS WITH HYPERGLYCEMIA, WITH LONG-TERM CURRENT USE OF INSULIN (HCC): ICD-10-CM

## 2024-09-15 LAB
ALBUMIN SERPL-MCNC: 4.1 G/DL (ref 3.9–4.9)
ALBUMIN/CREAT UR: 445 MG/G CREAT (ref 0–29)
ALP SERPL-CCNC: 89 IU/L (ref 44–121)
ALT SERPL-CCNC: 14 IU/L (ref 0–32)
AST SERPL-CCNC: 11 IU/L (ref 0–40)
BILIRUB SERPL-MCNC: 0.4 MG/DL (ref 0–1.2)
BUN SERPL-MCNC: 13 MG/DL (ref 6–20)
BUN/CREAT SERPL: 22 (ref 9–23)
CALCIUM SERPL-MCNC: 9.4 MG/DL (ref 8.7–10.2)
CHLORIDE SERPL-SCNC: 102 MMOL/L (ref 96–106)
CHOLEST SERPL-MCNC: 117 MG/DL (ref 100–199)
CO2 SERPL-SCNC: 22 MMOL/L (ref 20–29)
CREAT SERPL-MCNC: 0.6 MG/DL (ref 0.57–1)
CREAT UR-MCNC: 179.1 MG/DL
EGFRCR SERPLBLD CKD-EPI 2021: 117 ML/MIN/1.73
GLOBULIN SER CALC-MCNC: 2.5 G/DL (ref 1.5–4.5)
GLUCOSE SERPL-MCNC: 184 MG/DL (ref 70–99)
HDLC SERPL-MCNC: 38 MG/DL
LDLC SERPL CALC-MCNC: 60 MG/DL (ref 0–99)
MICROALBUMIN UR-MCNC: 796.3 UG/ML
POTASSIUM SERPL-SCNC: 4.2 MMOL/L (ref 3.5–5.2)
PROT SERPL-MCNC: 6.6 G/DL (ref 6–8.5)
SODIUM SERPL-SCNC: 139 MMOL/L (ref 134–144)
TRIGL SERPL-MCNC: 100 MG/DL (ref 0–149)
VLDLC SERPL CALC-MCNC: 19 MG/DL (ref 5–40)

## 2024-09-17 LAB
HBA1C MFR BLD: 11.6 % (ref 4.8–5.6)
IMP & REVIEW OF LAB RESULTS: NORMAL
Lab: NORMAL

## 2024-09-27 ENCOUNTER — OFFICE VISIT (OUTPATIENT)
Age: 39
End: 2024-09-27
Payer: COMMERCIAL

## 2024-09-27 VITALS
DIASTOLIC BLOOD PRESSURE: 88 MMHG | WEIGHT: 227 LBS | HEART RATE: 108 BPM | OXYGEN SATURATION: 90 % | BODY MASS INDEX: 38.76 KG/M2 | SYSTOLIC BLOOD PRESSURE: 148 MMHG | TEMPERATURE: 98.8 F | HEIGHT: 64 IN

## 2024-09-27 DIAGNOSIS — R80.1 PERSISTENT PROTEINURIA, UNSPECIFIED: ICD-10-CM

## 2024-09-27 DIAGNOSIS — E11.65 TYPE 2 DIABETES MELLITUS WITH HYPERGLYCEMIA, WITHOUT LONG-TERM CURRENT USE OF INSULIN (HCC): ICD-10-CM

## 2024-09-27 DIAGNOSIS — E78.2 MIXED HYPERLIPIDEMIA: ICD-10-CM

## 2024-09-27 DIAGNOSIS — Z79.4 ENCOUNTER FOR LONG-TERM (CURRENT) USE OF INSULIN (HCC): ICD-10-CM

## 2024-09-27 DIAGNOSIS — I10 ESSENTIAL (PRIMARY) HYPERTENSION: ICD-10-CM

## 2024-09-27 DIAGNOSIS — E11.3592 PROLIFERATIVE DIABETIC RETINOPATHY OF LEFT EYE WITHOUT MACULAR EDEMA ASSOCIATED WITH TYPE 2 DIABETES MELLITUS (HCC): Primary | ICD-10-CM

## 2024-09-27 DIAGNOSIS — F41.9 ANXIETY DISORDER, UNSPECIFIED TYPE: ICD-10-CM

## 2024-09-27 PROCEDURE — 3046F HEMOGLOBIN A1C LEVEL >9.0%: CPT | Performed by: INTERNAL MEDICINE

## 2024-09-27 PROCEDURE — 99214 OFFICE O/P EST MOD 30 MIN: CPT | Performed by: INTERNAL MEDICINE

## 2024-09-27 PROCEDURE — 3079F DIAST BP 80-89 MM HG: CPT | Performed by: INTERNAL MEDICINE

## 2024-09-27 PROCEDURE — 3077F SYST BP >= 140 MM HG: CPT | Performed by: INTERNAL MEDICINE

## 2024-09-27 RX ORDER — SEMAGLUTIDE 1.34 MG/ML
INJECTION, SOLUTION SUBCUTANEOUS
Qty: 9 ML | Refills: 3 | Status: SHIPPED | OUTPATIENT
Start: 2024-09-27

## 2024-09-27 RX ORDER — INSULIN GLARGINE 300 U/ML
INJECTION, SOLUTION SUBCUTANEOUS
Qty: 18 ML | Refills: 3 | Status: SHIPPED | OUTPATIENT
Start: 2024-09-27

## 2024-09-27 RX ORDER — INSULIN ASPART 100 [IU]/ML
INJECTION, SOLUTION INTRAVENOUS; SUBCUTANEOUS
Qty: 45 ML | Refills: 3 | Status: SHIPPED | OUTPATIENT
Start: 2024-09-27

## 2024-09-27 RX ORDER — ESCITALOPRAM OXALATE 10 MG/1
10 TABLET ORAL DAILY
Qty: 30 TABLET | Refills: 5 | Status: SHIPPED | OUTPATIENT
Start: 2024-09-27

## 2024-09-27 RX ORDER — IRBESARTAN 150 MG/1
150 TABLET ORAL DAILY
Qty: 90 TABLET | Refills: 3 | Status: SHIPPED | OUTPATIENT
Start: 2024-09-27

## 2024-09-27 RX ORDER — ATORVASTATIN CALCIUM 10 MG/1
10 TABLET, FILM COATED ORAL NIGHTLY
Qty: 90 TABLET | Refills: 3 | Status: SHIPPED | OUTPATIENT
Start: 2024-09-27

## 2024-10-03 ENCOUNTER — OFFICE VISIT (OUTPATIENT)
Age: 39
End: 2024-10-03

## 2024-10-03 VITALS
BODY MASS INDEX: 37.9 KG/M2 | RESPIRATION RATE: 18 BRPM | HEART RATE: 99 BPM | HEIGHT: 64 IN | WEIGHT: 222 LBS | OXYGEN SATURATION: 100 % | DIASTOLIC BLOOD PRESSURE: 78 MMHG | SYSTOLIC BLOOD PRESSURE: 139 MMHG

## 2024-10-03 DIAGNOSIS — N76.0 VAGINITIS AND VULVOVAGINITIS: ICD-10-CM

## 2024-10-03 DIAGNOSIS — Z01.419 WELL WOMAN EXAM WITH ROUTINE GYNECOLOGICAL EXAM: Primary | ICD-10-CM

## 2024-10-03 DIAGNOSIS — Z12.4 ENCOUNTER FOR SCREENING FOR MALIGNANT NEOPLASM OF CERVIX: ICD-10-CM

## 2024-10-03 RX ORDER — ACETAMINOPHEN AND CODEINE PHOSPHATE 120; 12 MG/5ML; MG/5ML
1 SOLUTION ORAL DAILY
Qty: 84 TABLET | Refills: 3 | Status: SHIPPED | OUTPATIENT
Start: 2024-10-03

## 2024-10-03 RX ORDER — FLUCONAZOLE 150 MG/1
150 TABLET ORAL ONCE
Qty: 1 TABLET | Refills: 0 | Status: SHIPPED | OUTPATIENT
Start: 2024-10-03 | End: 2024-10-03

## 2024-10-03 NOTE — PROGRESS NOTES
Elvia Nowak is a 39 y.o. female, , Patient's last menstrual period was 2024., who presents today for the following:  Chief Complaint   Patient presents with   • Annual Exam        No Known Allergies    Current Outpatient Medications   Medication Sig Dispense Refill   • fluconazole (DIFLUCAN) 150 MG tablet Take 1 tablet by mouth once for 1 dose 1 tablet 0   • norethindrone (JENCYCLA) 0.35 MG tablet Take 1 tablet by mouth daily 84 tablet 3   • OZEMPIC, 1 MG/DOSE, 4 MG/3ML SOPN sc injection Inject 1.0 mg subcutaneously every 7 days, E11.65 9 mL 3   • TOUJEO MAX SOLOSTAR 300 UNIT/ML SOPN 60 units at bed time 18 mL 3   • insulin aspart (NOVOLOG FLEXPEN) 100 UNIT/ML injection pen 8 units before meals  plus SSI  to max dose of   50 units a day 45 mL 3   • escitalopram (LEXAPRO) 10 MG tablet Take 1 tablet by mouth daily 30 tablet 5   • irbesartan (AVAPRO) 150 MG tablet Take 1 tablet by mouth daily 90 tablet 3   • empagliflozin (JARDIANCE) 10 MG tablet One pill before b-fast 90 tablet 3   • metFORMIN (GLUCOPHAGE) 1000 MG tablet TAKE 1 TABLET BY MOUTH 2 TIMES DAILY (WITH MEALS) 180 tablet 3   • Aspirin 81 MG CAPS Take by oral route.     • tacrolimus (PROTOPIC) 0.1 % ointment APPLY TO RASH ON FACE ONCE DAILY     • ofloxacin (OCUFLOX) 0.3 % solution PLEASE SEE ATTACHED FOR DETAILED DIRECTIONS     • mometasone (ELOCON) 0.1 % ointment APPLY TO RASH ON ARMS TWICE DAILY     • atorvastatin (LIPITOR) 10 MG tablet Take 1 tablet by mouth nightly at bedtime. 90 tablet 3   • fluconazole (DIFLUCAN) 150 MG tablet TAKE 1 TABLET BY MOUTH FOR A SINGLE DOSE (Patient not taking: Reported on 10/3/2024)     • chlorhexidine (PERIDEX) 0.12 % solution RINSE twice a day with 15 milliliters by mouth for 30 SECONDS then SPIT       No current facility-administered medications for this visit.         Past Medical History:   Diagnosis Date   • Abnormal Pap smear of cervix    • DM (diabetes mellitus) ([facility])    • Ectopic pregnancy    •

## 2024-10-03 NOTE — PROGRESS NOTES
\"Have you been to the ER, urgent care clinic since your last visit?  Hospitalized since your last visit?\"    NO    “Have you seen or consulted any other health care providers outside our system since your last visit?”    NO        Click Here for Release of Records Request

## 2024-10-08 LAB
., LABCORP: NORMAL
C TRACH RRNA CVX QL NAA+PROBE: NEGATIVE
CYTOLOGIST CVX/VAG CYTO: NORMAL
CYTOLOGY CVX/VAG DOC CYTO: NORMAL
CYTOLOGY CVX/VAG DOC THIN PREP: NORMAL
DX ICD CODE: NORMAL
Lab: NORMAL
N GONORRHOEA RRNA CVX QL NAA+PROBE: NEGATIVE
OTHER STN SPEC: NORMAL
STAT OF ADQ CVX/VAG CYTO-IMP: NORMAL

## 2025-01-14 LAB
ALBUMIN SERPL-MCNC: 4.2 G/DL (ref 3.9–4.9)
ALP SERPL-CCNC: 91 IU/L (ref 44–121)
ALT SERPL-CCNC: 13 IU/L (ref 0–32)
AST SERPL-CCNC: 12 IU/L (ref 0–40)
BILIRUB SERPL-MCNC: 0.3 MG/DL (ref 0–1.2)
BUN SERPL-MCNC: 13 MG/DL (ref 6–24)
BUN/CREAT SERPL: 22 (ref 9–23)
CALCIUM SERPL-MCNC: 10 MG/DL (ref 8.7–10.2)
CHLORIDE SERPL-SCNC: 105 MMOL/L (ref 96–106)
CHOLEST SERPL-MCNC: 133 MG/DL (ref 100–199)
CO2 SERPL-SCNC: 17 MMOL/L (ref 20–29)
CREAT SERPL-MCNC: 0.58 MG/DL (ref 0.57–1)
EGFRCR SERPLBLD CKD-EPI 2021: 117 ML/MIN/1.73
GLOBULIN SER CALC-MCNC: 2.7 G/DL (ref 1.5–4.5)
GLUCOSE SERPL-MCNC: 150 MG/DL (ref 70–99)
HBA1C MFR BLD: 7.8 % (ref 4.8–5.6)
HDLC SERPL-MCNC: 43 MG/DL
LDLC SERPL CALC-MCNC: 68 MG/DL (ref 0–99)
POTASSIUM SERPL-SCNC: 4.4 MMOL/L (ref 3.5–5.2)
PROT SERPL-MCNC: 6.9 G/DL (ref 6–8.5)
SODIUM SERPL-SCNC: 140 MMOL/L (ref 134–144)
TRIGL SERPL-MCNC: 125 MG/DL (ref 0–149)
VLDLC SERPL CALC-MCNC: 22 MG/DL (ref 5–40)

## 2025-01-15 LAB
ALBUMIN/CREAT UR: 140 MG/G CREAT (ref 0–29)
CREAT UR-MCNC: 42.6 MG/DL
MICROALBUMIN UR-MCNC: 59.7 UG/ML

## 2025-01-16 LAB
IMP & REVIEW OF LAB RESULTS: NORMAL
Lab: NORMAL

## 2025-01-18 ENCOUNTER — TELEMEDICINE (OUTPATIENT)
Age: 40
End: 2025-01-18
Payer: COMMERCIAL

## 2025-01-18 DIAGNOSIS — R80.1 PERSISTENT PROTEINURIA, UNSPECIFIED: ICD-10-CM

## 2025-01-18 DIAGNOSIS — Z79.4 ENCOUNTER FOR LONG-TERM (CURRENT) USE OF INSULIN (HCC): ICD-10-CM

## 2025-01-18 DIAGNOSIS — I10 ESSENTIAL (PRIMARY) HYPERTENSION: ICD-10-CM

## 2025-01-18 DIAGNOSIS — E78.2 MIXED HYPERLIPIDEMIA: ICD-10-CM

## 2025-01-18 DIAGNOSIS — E11.65 TYPE 2 DIABETES MELLITUS WITH HYPERGLYCEMIA, WITHOUT LONG-TERM CURRENT USE OF INSULIN (HCC): Primary | ICD-10-CM

## 2025-01-18 PROCEDURE — 3051F HG A1C>EQUAL 7.0%<8.0%: CPT | Performed by: INTERNAL MEDICINE

## 2025-01-18 PROCEDURE — 99214 OFFICE O/P EST MOD 30 MIN: CPT | Performed by: INTERNAL MEDICINE

## 2025-01-18 NOTE — PATIENT INSTRUCTIONS
DRINK water   Do not skip meals  Do not eat in between meals     Reduce carbs- pasta, rice, potatoes, bread   Try to avoid processed bread products like BISCUITS, CROISSANTS, MUFFINS     Do not drink juices or sodas, even if they are calorie zero or diet drinks and especially avoid using powders like crystalloids , LESLY-AIDS      Do not eat peanut butter      Do not eat sugar free cookies and cakes   Do not eat peaches, oranges, pineapples, raisins, grapes , canteloupe , honey dew, mangoes , watermelon  and fruit medleys        --------------------------------------------------------------------------------------------------------     Medtronic , tandem  and omnipod videos         Stay  on   Metformin           ozempic    1   mg dose         Stay     on  toujeo   60 units a day , jardiance  and  metformin          Take novolog 10  units before each meal and add SSI as follows   If blood sugars are::  (     5 gm : 1 units )      150-200 mg 2 units     201-250 mg 4 units     251-300 mg 6 units     301-350 mg 8 units     351-400 mg 10 units     401-450 mg 12 units     451-500 mg 14 units     Less than 70 mg NO INSULIN

## 2025-01-18 NOTE — PROGRESS NOTES
[facility] DIABETES AND ENDOCRINOLOGY                                                           Hoda Reeves MD FACE         **THIS IS A VIRTUAL VISIT VIA AUDIO- VIDEO SYNCHRONOUS DISCUSSION. PATIENT AGREED TO HAVE THEIR CARE DELIVERED OVER A [employer]illion[employer]/DOXY.ME VIDEO VISIT IN PLACE OF THEIR REGULARLY SCHEDULED OFFICE VISIT**   Pt  is aware that this is a billable encounter and is responsible for copays/deductibles   Patient gave a verbal consent to proceed with virtual video visit   Patient is at home and I, the provider,  am at the office care diabetes and endocrinology          HISTORY OF PRESENT ILLNESS       Elvia Nowak is a 39   y.o.  female.   HPI   Patient here for  follow up after  Last  visit of Type 2 diabetes mellitus   From  last visit , Sept 2024      H/o DM 2  for 19 years ; HTN  and obesity    No retinopathy per  her , nephropathy present       Jan 2025 Sept 2024       Pt says she is forgetting to take meds despite reminders   A lot is going on in life   She is unsure what to do    She is very quiet  , non communicative   Unable to grasp her stress      May 2024      In the interim, she is found to have cysts  on  ovaries  and  fibroids  in uterus, going for surgery  No meter in hand , No paul sensors          March 2023      Continues to be in stress from health related issues    Lost 9 lbs     She is not very interactive today    No meter in hand    Wants sensors,    Not changed her diet much            Old history      Referred : by self/pcp   H/o diabetes for 15  years    Current A1C is 9.8 %  From oct 2019  and symptoms/problems include polyuria and polydipsia    Current diabetic medications include jardiance , lantus and humalog  And  Metformin 1 gm bid twice a day .    Current monitoring regimen: home blood tests - weekly         Review of Systems    Constitutional: Negative.     Psychiatric/Behavioral: Negative.           Physical Exam    Constitutional: She

## 2025-01-18 NOTE — PROGRESS NOTES
Elvia Nowak is a 40 y.o. female here for   Chief Complaint   Patient presents with    Diabetes       1. Have you been to the ER, urgent care clinic since your last visit?  Hospitalized since your last visit? -no    2. Have you seen or consulted any other health care providers outside of the [facility] since your last visit?  Include any pap smears or colon screening.-no

## 2025-01-21 DIAGNOSIS — E78.2 MIXED HYPERLIPIDEMIA: ICD-10-CM

## 2025-01-21 DIAGNOSIS — E11.65 TYPE 2 DIABETES MELLITUS WITH HYPERGLYCEMIA, WITHOUT LONG-TERM CURRENT USE OF INSULIN (HCC): Primary | ICD-10-CM

## 2025-03-13 DIAGNOSIS — E78.2 MIXED HYPERLIPIDEMIA: ICD-10-CM

## 2025-03-13 DIAGNOSIS — E11.3592 PROLIFERATIVE DIABETIC RETINOPATHY OF LEFT EYE WITHOUT MACULAR EDEMA ASSOCIATED WITH TYPE 2 DIABETES MELLITUS (HCC): ICD-10-CM

## 2025-03-13 DIAGNOSIS — Z79.4 ENCOUNTER FOR LONG-TERM (CURRENT) USE OF INSULIN (HCC): ICD-10-CM

## 2025-03-13 DIAGNOSIS — F41.9 ANXIETY DISORDER, UNSPECIFIED TYPE: ICD-10-CM

## 2025-03-13 DIAGNOSIS — E11.65 TYPE 2 DIABETES MELLITUS WITH HYPERGLYCEMIA, WITHOUT LONG-TERM CURRENT USE OF INSULIN (HCC): ICD-10-CM

## 2025-03-13 DIAGNOSIS — I10 ESSENTIAL (PRIMARY) HYPERTENSION: ICD-10-CM

## 2025-03-14 RX ORDER — IRBESARTAN 150 MG/1
150 TABLET ORAL DAILY
Qty: 90 TABLET | Refills: 3 | Status: SHIPPED | OUTPATIENT
Start: 2025-03-14

## 2025-03-14 RX ORDER — ESCITALOPRAM OXALATE 10 MG/1
10 TABLET ORAL DAILY
Qty: 90 TABLET | Refills: 3 | Status: SHIPPED | OUTPATIENT
Start: 2025-03-14

## 2025-08-17 LAB
ALBUMIN SERPL-MCNC: 4.1 G/DL (ref 3.9–4.9)
ALBUMIN/CREAT UR: 568 MG/G CREAT (ref 0–29)
ALP SERPL-CCNC: 84 IU/L (ref 44–121)
ALT SERPL-CCNC: 16 IU/L (ref 0–32)
AST SERPL-CCNC: 14 IU/L (ref 0–40)
BILIRUB SERPL-MCNC: 0.4 MG/DL (ref 0–1.2)
BUN SERPL-MCNC: 11 MG/DL (ref 6–24)
BUN/CREAT SERPL: 18 (ref 9–23)
CALCIUM SERPL-MCNC: 9.3 MG/DL (ref 8.7–10.2)
CHLORIDE SERPL-SCNC: 105 MMOL/L (ref 96–106)
CHOLEST SERPL-MCNC: 110 MG/DL (ref 100–199)
CO2 SERPL-SCNC: 19 MMOL/L (ref 20–29)
CREAT SERPL-MCNC: 0.62 MG/DL (ref 0.57–1)
CREAT UR-MCNC: 90.6 MG/DL
EGFRCR SERPLBLD CKD-EPI 2021: 115 ML/MIN/1.73
GLOBULIN SER CALC-MCNC: 3 G/DL (ref 1.5–4.5)
GLUCOSE SERPL-MCNC: 153 MG/DL (ref 70–99)
HBA1C MFR BLD: 8.7 % (ref 4.8–5.6)
HDLC SERPL-MCNC: 33 MG/DL
IMP & REVIEW OF LAB RESULTS: NORMAL
LDLC SERPL CALC-MCNC: 60 MG/DL (ref 0–99)
Lab: NORMAL
MICROALBUMIN UR-MCNC: 514.9 UG/ML
POTASSIUM SERPL-SCNC: 4.3 MMOL/L (ref 3.5–5.2)
PROT SERPL-MCNC: 7.1 G/DL (ref 6–8.5)
SODIUM SERPL-SCNC: 141 MMOL/L (ref 134–144)
TRIGL SERPL-MCNC: 88 MG/DL (ref 0–149)
VLDLC SERPL CALC-MCNC: 17 MG/DL (ref 5–40)

## 2025-08-29 ENCOUNTER — OFFICE VISIT (OUTPATIENT)
Age: 40
End: 2025-08-29
Payer: MEDICAID

## 2025-08-29 VITALS
TEMPERATURE: 98.7 F | DIASTOLIC BLOOD PRESSURE: 74 MMHG | BODY MASS INDEX: 35.89 KG/M2 | WEIGHT: 210.2 LBS | HEART RATE: 98 BPM | HEIGHT: 64 IN | OXYGEN SATURATION: 97 % | SYSTOLIC BLOOD PRESSURE: 136 MMHG

## 2025-08-29 DIAGNOSIS — E11.3592 PROLIFERATIVE DIABETIC RETINOPATHY OF LEFT EYE WITHOUT MACULAR EDEMA ASSOCIATED WITH TYPE 2 DIABETES MELLITUS (HCC): ICD-10-CM

## 2025-08-29 DIAGNOSIS — Z79.4 ENCOUNTER FOR LONG-TERM (CURRENT) USE OF INSULIN (HCC): ICD-10-CM

## 2025-08-29 DIAGNOSIS — E11.65 TYPE 2 DIABETES MELLITUS WITH HYPERGLYCEMIA, WITHOUT LONG-TERM CURRENT USE OF INSULIN (HCC): ICD-10-CM

## 2025-08-29 DIAGNOSIS — E78.2 MIXED HYPERLIPIDEMIA: ICD-10-CM

## 2025-08-29 DIAGNOSIS — F41.9 ANXIETY DISORDER, UNSPECIFIED TYPE: ICD-10-CM

## 2025-08-29 DIAGNOSIS — R80.1 PERSISTENT PROTEINURIA, UNSPECIFIED: ICD-10-CM

## 2025-08-29 DIAGNOSIS — I10 ESSENTIAL (PRIMARY) HYPERTENSION: ICD-10-CM

## 2025-08-29 PROCEDURE — 99214 OFFICE O/P EST MOD 30 MIN: CPT | Performed by: INTERNAL MEDICINE

## 2025-08-29 PROCEDURE — 3075F SYST BP GE 130 - 139MM HG: CPT | Performed by: INTERNAL MEDICINE

## 2025-08-29 PROCEDURE — 3052F HG A1C>EQUAL 8.0%<EQUAL 9.0%: CPT | Performed by: INTERNAL MEDICINE

## 2025-08-29 PROCEDURE — 3078F DIAST BP <80 MM HG: CPT | Performed by: INTERNAL MEDICINE

## 2025-08-29 RX ORDER — INSULIN GLARGINE 300 U/ML
INJECTION, SOLUTION SUBCUTANEOUS
Qty: 18 ML | Refills: 3 | Status: SHIPPED | OUTPATIENT
Start: 2025-08-29

## 2025-08-29 RX ORDER — FLUCONAZOLE 150 MG/1
TABLET ORAL
Qty: 1 TABLET | Refills: 0 | Status: SHIPPED | OUTPATIENT
Start: 2025-08-29

## 2025-08-29 RX ORDER — ATORVASTATIN CALCIUM 10 MG/1
10 TABLET, FILM COATED ORAL NIGHTLY
Qty: 90 TABLET | Refills: 3 | Status: SHIPPED | OUTPATIENT
Start: 2025-08-29

## 2025-08-29 RX ORDER — INSULIN ASPART 100 [IU]/ML
INJECTION, SOLUTION INTRAVENOUS; SUBCUTANEOUS
Qty: 45 ML | Refills: 3 | Status: SHIPPED | OUTPATIENT
Start: 2025-08-29

## 2025-08-29 RX ORDER — METFORMIN HYDROCHLORIDE 500 MG/1
TABLET, EXTENDED RELEASE ORAL
Qty: 360 TABLET | Refills: 3 | Status: SHIPPED | OUTPATIENT
Start: 2025-08-29